# Patient Record
Sex: MALE | Race: WHITE | NOT HISPANIC OR LATINO | Employment: FULL TIME | URBAN - METROPOLITAN AREA
[De-identification: names, ages, dates, MRNs, and addresses within clinical notes are randomized per-mention and may not be internally consistent; named-entity substitution may affect disease eponyms.]

---

## 2020-12-22 DIAGNOSIS — Z23 NEED FOR VACCINATION: ICD-10-CM

## 2021-02-23 ENCOUNTER — EH NON-PROVIDER (OUTPATIENT)
Dept: OCCUPATIONAL MEDICINE | Facility: CLINIC | Age: 21
End: 2021-02-23
Payer: MEDICAID

## 2021-02-23 ENCOUNTER — HOSPITAL ENCOUNTER (OUTPATIENT)
Facility: MEDICAL CENTER | Age: 21
End: 2021-02-23
Attending: NURSE PRACTITIONER
Payer: COMMERCIAL

## 2021-02-23 ENCOUNTER — EMPLOYEE HEALTH (OUTPATIENT)
Dept: OCCUPATIONAL MEDICINE | Facility: CLINIC | Age: 21
End: 2021-02-23
Payer: MEDICAID

## 2021-02-23 DIAGNOSIS — Z02.1 PRE-EMPLOYMENT DRUG SCREENING: Primary | ICD-10-CM

## 2021-02-23 DIAGNOSIS — Z02.1 PRE-EMPLOYMENT HEALTH SCREENING EXAMINATION: ICD-10-CM

## 2021-02-23 DIAGNOSIS — Z02.1 PRE-EMPLOYMENT DRUG SCREENING: ICD-10-CM

## 2021-02-23 LAB
AMP AMPHETAMINE: NORMAL
BAR BARBITURATES: NORMAL
BZO BENZODIAZEPINES: NORMAL
COC COCAINE: NORMAL
INT CON NEG: NORMAL
INT CON POS: NORMAL
MDMA ECSTASY: NORMAL
MET METHAMPHETAMINES: NORMAL
MTD METHADONE: NORMAL
OPI OPIATES: NORMAL
OXY OXYCODONE: NORMAL
PCP PHENCYCLIDINE: NORMAL
POC URINE DRUG SCREEN OCDRS: NEGATIVE
THC: NORMAL

## 2021-02-23 PROCEDURE — 8915 PR COMPREHENSIVE PHYSICAL: Performed by: PREVENTIVE MEDICINE

## 2021-02-23 PROCEDURE — 80305 DRUG TEST PRSMV DIR OPT OBS: CPT | Performed by: NURSE PRACTITIONER

## 2021-02-23 PROCEDURE — 86480 TB TEST CELL IMMUN MEASURE: CPT | Performed by: NURSE PRACTITIONER

## 2021-02-26 LAB
GAMMA INTERFERON BACKGROUND BLD IA-ACNC: 0.04 IU/ML
M TB IFN-G BLD-IMP: NEGATIVE
M TB IFN-G CD4+ BCKGRND COR BLD-ACNC: 0.13 IU/ML
MITOGEN IGNF BCKGRD COR BLD-ACNC: >10 IU/ML
QFT TB2 - NIL TBQ2: 0.13 IU/ML

## 2021-03-03 ENCOUNTER — EH NON-PROVIDER (OUTPATIENT)
Dept: OCCUPATIONAL MEDICINE | Facility: CLINIC | Age: 21
End: 2021-03-03
Payer: MEDICAID

## 2021-03-03 DIAGNOSIS — Z71.85 IMMUNIZATION COUNSELING: ICD-10-CM

## 2021-05-13 ENCOUNTER — IMMUNIZATION (OUTPATIENT)
Dept: OTHER | Facility: MEDICAL CENTER | Age: 21
End: 2021-05-13
Attending: NURSE PRACTITIONER
Payer: COMMERCIAL

## 2021-05-13 DIAGNOSIS — Z02.1 PRE-EMPLOYMENT DRUG SCREENING: ICD-10-CM

## 2021-05-13 DIAGNOSIS — Z23 ENCOUNTER FOR VACCINATION: Primary | ICD-10-CM

## 2021-05-13 PROCEDURE — 0011A MODERNA SARS-COV-2 VACCINE: CPT

## 2021-05-13 PROCEDURE — 91301 MODERNA SARS-COV-2 VACCINE: CPT

## 2021-07-09 ENCOUNTER — EH NON-PROVIDER (OUTPATIENT)
Dept: OCCUPATIONAL MEDICINE | Facility: CLINIC | Age: 21
End: 2021-07-09

## 2021-07-09 DIAGNOSIS — Z02.89 ENCOUNTER FOR OCCUPATIONAL HEALTH EXAMINATION INVOLVING RESPIRATOR: ICD-10-CM

## 2021-07-09 PROCEDURE — 94375 RESPIRATORY FLOW VOLUME LOOP: CPT | Performed by: PREVENTIVE MEDICINE

## 2021-09-17 ENCOUNTER — TELEPHONE (OUTPATIENT)
Dept: SCHEDULING | Facility: IMAGING CENTER | Age: 21
End: 2021-09-17

## 2021-09-20 ENCOUNTER — HOSPITAL ENCOUNTER (OUTPATIENT)
Dept: LAB | Facility: MEDICAL CENTER | Age: 21
End: 2021-09-20
Attending: FAMILY MEDICINE
Payer: COMMERCIAL

## 2021-09-20 ENCOUNTER — OFFICE VISIT (OUTPATIENT)
Dept: MEDICAL GROUP | Facility: PHYSICIAN GROUP | Age: 21
End: 2021-09-20
Payer: COMMERCIAL

## 2021-09-20 VITALS
WEIGHT: 217.4 LBS | BODY MASS INDEX: 34.12 KG/M2 | SYSTOLIC BLOOD PRESSURE: 130 MMHG | DIASTOLIC BLOOD PRESSURE: 88 MMHG | OXYGEN SATURATION: 97 % | TEMPERATURE: 98.1 F | HEIGHT: 67 IN | HEART RATE: 68 BPM

## 2021-09-20 DIAGNOSIS — E66.9 OBESITY (BMI 30-39.9): ICD-10-CM

## 2021-09-20 DIAGNOSIS — M54.50 CHRONIC BILATERAL LOW BACK PAIN WITHOUT SCIATICA: ICD-10-CM

## 2021-09-20 DIAGNOSIS — Z23 NEED FOR VACCINATION: ICD-10-CM

## 2021-09-20 DIAGNOSIS — G89.29 CHRONIC BILATERAL LOW BACK PAIN WITHOUT SCIATICA: ICD-10-CM

## 2021-09-20 LAB
ALBUMIN SERPL BCP-MCNC: 4.2 G/DL (ref 3.2–4.9)
ALBUMIN/GLOB SERPL: 1.4 G/DL
ALP SERPL-CCNC: 84 U/L (ref 30–99)
ALT SERPL-CCNC: 28 U/L (ref 2–50)
ANION GAP SERPL CALC-SCNC: 15 MMOL/L (ref 7–16)
AST SERPL-CCNC: 62 U/L (ref 12–45)
BILIRUB SERPL-MCNC: 0.7 MG/DL (ref 0.1–1.5)
BUN SERPL-MCNC: 11 MG/DL (ref 8–22)
CALCIUM SERPL-MCNC: 9.4 MG/DL (ref 8.5–10.5)
CHLORIDE SERPL-SCNC: 101 MMOL/L (ref 96–112)
CHOLEST SERPL-MCNC: 139 MG/DL (ref 100–199)
CO2 SERPL-SCNC: 23 MMOL/L (ref 20–33)
CREAT SERPL-MCNC: 0.85 MG/DL (ref 0.5–1.4)
ERYTHROCYTE [DISTWIDTH] IN BLOOD BY AUTOMATED COUNT: 40.6 FL (ref 35.9–50)
EST. AVERAGE GLUCOSE BLD GHB EST-MCNC: 114 MG/DL
GLOBULIN SER CALC-MCNC: 3 G/DL (ref 1.9–3.5)
GLUCOSE SERPL-MCNC: 71 MG/DL (ref 65–99)
HBA1C MFR BLD: 5.6 % (ref 4–5.6)
HCT VFR BLD AUTO: 46.2 % (ref 42–52)
HDLC SERPL-MCNC: 51 MG/DL
HGB BLD-MCNC: 15.9 G/DL (ref 14–18)
LDLC SERPL CALC-MCNC: 76 MG/DL
MCH RBC QN AUTO: 30.3 PG (ref 27–33)
MCHC RBC AUTO-ENTMCNC: 34.4 G/DL (ref 33.7–35.3)
MCV RBC AUTO: 88 FL (ref 81.4–97.8)
PLATELET # BLD AUTO: 202 K/UL (ref 164–446)
PMV BLD AUTO: 11 FL (ref 9–12.9)
POTASSIUM SERPL-SCNC: 3.8 MMOL/L (ref 3.6–5.5)
PROT SERPL-MCNC: 7.2 G/DL (ref 6–8.2)
RBC # BLD AUTO: 5.25 M/UL (ref 4.7–6.1)
SODIUM SERPL-SCNC: 139 MMOL/L (ref 135–145)
TRIGL SERPL-MCNC: 58 MG/DL (ref 0–149)
TSH SERPL DL<=0.005 MIU/L-ACNC: 0.39 UIU/ML (ref 0.38–5.33)
WBC # BLD AUTO: 8.5 K/UL (ref 4.8–10.8)

## 2021-09-20 PROCEDURE — 80061 LIPID PANEL: CPT

## 2021-09-20 PROCEDURE — 36415 COLL VENOUS BLD VENIPUNCTURE: CPT

## 2021-09-20 PROCEDURE — 80053 COMPREHEN METABOLIC PANEL: CPT

## 2021-09-20 PROCEDURE — 85027 COMPLETE CBC AUTOMATED: CPT

## 2021-09-20 PROCEDURE — 99204 OFFICE O/P NEW MOD 45 MIN: CPT | Mod: 25 | Performed by: FAMILY MEDICINE

## 2021-09-20 PROCEDURE — 84443 ASSAY THYROID STIM HORMONE: CPT

## 2021-09-20 PROCEDURE — 90471 IMMUNIZATION ADMIN: CPT | Performed by: FAMILY MEDICINE

## 2021-09-20 PROCEDURE — 90686 IIV4 VACC NO PRSV 0.5 ML IM: CPT | Performed by: FAMILY MEDICINE

## 2021-09-20 PROCEDURE — 83036 HEMOGLOBIN GLYCOSYLATED A1C: CPT

## 2021-09-20 RX ORDER — CYCLOBENZAPRINE HCL 5 MG
5 TABLET ORAL 3 TIMES DAILY PRN
Qty: 30 TABLET | Refills: 0 | Status: SHIPPED | OUTPATIENT
Start: 2021-09-20 | End: 2021-12-02

## 2021-09-20 ASSESSMENT — FIBROSIS 4 INDEX: FIB4 SCORE: 0.38

## 2021-09-20 NOTE — PROGRESS NOTES
"Subjective:     CC:  Diagnoses of Obesity (BMI 30-39.9), Chronic bilateral low back pain without sciatica, and Need for vaccination were pertinent to this visit.    HISTORY OF THE PRESENT ILLNESS: Patient is a 20 y.o. male. This pleasant patient is here today to establish care and discuss the following problems.     Obesity (BMI 30-39.9)  Chronic issue  Has tried to lose weight. He is trying to improve his diet. Still likes sweets  Also trying to keep active  Lost from 240 to 217lbs.   Goal is is 175lbs by 2/22    Chronic bilateral low back pain without sciatica  Chronic issue   Has had low back pain for a couple of years  Pain is mainly in the morning  Has not taken any medicine for it  Denies cauda equina  Would like x rays      Allergies: Patient has no known allergies.    Current Outpatient Medications Ordered in Epic   Medication Sig Dispense Refill   • cyclobenzaprine (FLEXERIL) 5 mg tablet Take 1 Tablet by mouth 3 times a day as needed. 30 Tablet 0     No current Epic-ordered facility-administered medications on file.       Past Medical History:   Diagnosis Date   • Anxiety    • Depression    • Eating disorder          Health Maintenance: Completed    ROS:   Gen: no fevers/chills, no changes in weight  Eyes: no changes in vision  ENT: no sore throat, no hearing loss, no bloody nose  Pulm: no sob, no cough  CV: no chest pain, no palpitations  GI: no nausea/vomiting, no diarrhea  Skin: no rash      Objective:     Exam: /88 (BP Location: Right arm, Patient Position: Sitting, BP Cuff Size: Adult)   Pulse 68   Temp 36.7 °C (98.1 °F) (Temporal)   Ht 1.702 m (5' 7\")   Wt 98.6 kg (217 lb 6.4 oz)   SpO2 97%  Body mass index is 34.05 kg/m².    Constitutional: Alert, no distress, well-groomed.  Skin: Warm, dry, good turgor, no rashes in visible areas.  Eye: Equal, round and reactive, conjunctiva clear, lids normal.  ENMT: Lips without lesions, good dentition, moist mucous membranes.  Neck: Trachea midline, no " masses, no thyromegaly.  Respiratory: Unlabored respiratory effort, no cough.  MSK: Normal gait, moves all extremities.  Neuro: Grossly non-focal.   Psych: Alert and oriented x3, normal affect and mood.        Assessment & Plan:   20 y.o. male with the following -    1. Obesity (BMI 30-39.9)  Chronic, stable condition.  The patient has been reinforcing lifestyle change and has been able to successfully lose about 23 pounds in the recent past.  I commended him for his efforts.  I would like to establish baseline blood work today to establish a baseline.  - CBC WITHOUT DIFFERENTIAL; Future  - Comp Metabolic Panel; Future  - HEMOGLOBIN A1C; Future  - Lipid Profile; Future  - TSH WITH REFLEX TO FT4; Future    2. Chronic bilateral low back pain without sciatica  Chronic, stable condition.  Longstanding history of low back pain especially in the morning.  I would like to proceed with x-rays of the lumbar spine and a trial of Flexeril.  - DX-LUMBAR SPINE-2 OR 3 VIEWS; Future  - cyclobenzaprine (FLEXERIL) 5 mg tablet; Take 1 Tablet by mouth 3 times a day as needed.  Dispense: 30 Tablet; Refill: 0    3. Need for vaccination  - Influenza Vaccine Quad Injection (PF)      Return in about 3 months (around 12/20/2021).    Please note that this dictation was created using voice recognition software. I have made every reasonable attempt to correct obvious errors, but I expect that there are errors of grammar and possibly content that I did not discover before finalizing the note.

## 2021-09-20 NOTE — ASSESSMENT & PLAN NOTE
Chronic issue  Has tried to lose weight. He is trying to improve his diet. Still likes sweets  Also trying to keep active  Lost from 240 to 217lbs.   Goal is is 175lbs by 2/22

## 2021-09-20 NOTE — ASSESSMENT & PLAN NOTE
Chronic issue   Has had low back pain for a couple of years  Pain is mainly in the morning  Has not taken any medicine for it  Denies cauda equina  Would like x rays

## 2021-12-01 NOTE — PROGRESS NOTES
Subjective:     CC:  Diagnoses of Obesity (BMI 30-39.9), Episode of recurrent major depressive disorder, unspecified depression episode severity (HCC), Elevated AST (SGOT), Social anxiety disorder, LENIN (obstructive sleep apnea), Screening for HIV (human immunodeficiency virus), and Need for hepatitis C screening test were pertinent to this visit.    HISTORY OF THE PRESENT ILLNESS: Patient is a 20 y.o. male. This pleasant patient is here today to establish care and discuss chronic conditions. His prior PCP was Dr. Elio Hagen.    Problem   Depression    Chronic condition since high school. He feels sad sometimes, low energy, low energy, brain fog. He has tried counseling therapy last year which helped but there was an issue with insurance so that stopped. He has never tried medication in the past. There are home stressors and work stressors. Denies SI/HI. He sleeps about 5-6 hours a night. He does feel sleepy in the afternoon. He tends to eat a lot at once for dinner. Energy level is usually low. Hanging out with his friend and family makes him happy.     Elevated Ast (Sgot)    Chronic condition. Recent AST 60s (09/2021).      Social Anxiety Disorder    Chronic condition. He feels he tends to keep to himself most of the time.     Lenin (Obstructive Sleep Apnea)    Chronic condition. Patient states he sleeps about 5-6 hours each night. He does snore at night. He does feel sleepy in the afternoon everyday. His father has LENIN.     Obesity (Bmi 30-39.9)    Chronic condition. He tends to eat fast food in general. He stays active with going to gym 3x/week for about an hour each time.       Chronic Bilateral Low Back Pain Without Sciatica (Resolved)       Current Outpatient Medications Ordered in Epic   Medication Sig Dispense Refill   • FLUoxetine (PROZAC) 10 MG Cap Take 1 Capsule by mouth every day. 30 Capsule 3     No current [facility]-ordered facility-administered medications on file.     SH: lives with parents at home,  "single, he drinks 10 shots at parties which is occasional, he is going to [employer] for associate degree, never smoker, social EtOH, denies illicit drugs    Health Maintenance: reviewed and discussed with patient  Vaccines: flu received 09/2021, Moderna #2 received 07/2021, Tdap received 02/2020, Trumenba completed 07/2018, Gardasil 9 completed 02/2016    ROS:   Gen: no fevers/chills, no changes in weight  Eyes: no changes in vision  ENT: no sore throat, no hearing loss, no bloody nose  Pulm: no sob, no cough  CV: no chest pain, no palpitations  GI: no nausea/vomiting, no diarrhea  : no dysuria  MSk: no myalgias  Skin: no rash  Neuro: no headaches, no numbness/tingling  Heme/Lymph: no easy bruising      Objective:     Exam: /84 (BP Location: Left arm, Patient Position: Sitting, BP Cuff Size: Adult)   Pulse 72   Temp 36.7 °C (98 °F) (Temporal)   Resp 16   Ht 1.727 m (5' 8\")   Wt 100 kg (220 lb 7.4 oz)   SpO2 97%  Body mass index is 33.52 kg/m².    General: Normal appearing. No distress.  HEENT: Normocephalic. Nasal mucosa benign, oropharynx is without erythema, edema or exudates. Mallampati 4.   Neck: Supple without JVD or bruit. Neck circumference 44.5cm.  Pulmonary: Clear to ausculation. Normal effort. No rales, ronchi, or wheezing.  Cardiovascular: Regular rate and rhythm without murmur.  Abdomen: Soft, nontender, nondistended. Normal bowel sounds.  Neurologic: Grossly nonfocal  Skin: Warm and dry.  No obvious lesions.  Musculoskeletal: Normal gait. No extremity cyanosis, clubbing, or edema.  Psych: Normal mood and affect. Alert and oriented x3. Judgment and insight is normal.    Labs:   Lab Results   Component Value Date/Time    WBC 8.5 09/20/2021 03:19 PM    RBC 5.25 09/20/2021 03:19 PM    HEMOGLOBIN 15.9 09/20/2021 03:19 PM    HEMATOCRIT 46.2 09/20/2021 03:19 PM    MCV 88.0 09/20/2021 03:19 PM    MCH 30.3 09/20/2021 03:19 PM    MCHC 34.4 09/20/2021 03:19 PM    MPV 11.0 09/20/2021 03:19 PM      Lab " Results   Component Value Date/Time    SODIUM 139 09/20/2021 03:19 PM    POTASSIUM 3.8 09/20/2021 03:19 PM    CHLORIDE 101 09/20/2021 03:19 PM    CO2 23 09/20/2021 03:19 PM    ANION 15.0 09/20/2021 03:19 PM    GLUCOSE 71 09/20/2021 03:19 PM    BUN 11 09/20/2021 03:19 PM    CREATININE 0.85 09/20/2021 03:19 PM    CALCIUM 9.4 09/20/2021 03:19 PM    ASTSGOT 62 (H) 09/20/2021 03:19 PM    ALTSGPT 28 09/20/2021 03:19 PM    TBILIRUBIN 0.7 09/20/2021 03:19 PM    ALBUMIN 4.2 09/20/2021 03:19 PM    TOTPROTEIN 7.2 09/20/2021 03:19 PM    GLOBULIN 3.0 09/20/2021 03:19 PM    AGRATIO 1.4 09/20/2021 03:19 PM     Lab Results   Component Value Date/Time    HBA1C 5.6 09/20/2021 03:19 PM      Lab Results   Component Value Date/Time    CHOLSTRLTOT 139 09/20/2021 1519    TRIGLYCERIDE 58 09/20/2021 1519    HDL 51 09/20/2021 1519    LDL 76 09/20/2021 1519     Lab Results   Component Value Date/Time    TSHULTRASEN 0.390 09/20/2021 1519     PHQ9 score 10    STOP-BANG score 4 high risk for HEMANT    Assessment & Plan:   20 y.o. male with the following -    Problem List Items Addressed This Visit     Obesity (BMI 30-39.9)  Body mass index is 33.52 kg/m².  - discussed lifestyle modifications including weight loss (lose 5-10% of current body weight, no more than 2 pounds per week), healthy diet (eat regular meals with a variety of food, limit daily intake of saturated fat and sodium, limit high-sugar foods, avoid sodas and alcohol, and stay physically active (at least 30 minutes of moderate-intensity physical activity 3-5x/week)       Depression  Chronic condition, persistent. PHQ9 score 10. Patient would like to trial medication in addition to psychotherapy.  - start Prozac 10mg daily, adjust dose based on tolerability/response  - refer to behavioral health  - we discussed adverse effects of SSRI including fatigue/more energy or anxiety, rare incidence of suicidality, weight changes, headaches when starting. Patient advised to return to clinic if  having these or other symptoms associated with the new medication    Relevant Medications    FLUoxetine (PROZAC) 10 MG Cap    Other Relevant Orders    Referral to Behavioral Health    Elevated AST (SGOT)  Possibly from occasional alcohol use.  - f/u liver panel, HIV and hepC screen    Relevant Orders    HEPATIC FUNCTION PANEL    Social anxiety disorder  Chronic condition, persistent.  - start Prozac 10mg daily, adjust dose based on tolerability/response  - we discussed adverse effects of SSRI including fatigue/more energy or anxiety, rare incidence of suicidality, weight changes, headaches when starting. Patient advised to return to clinic if having these or other symptoms associated with the new medication    Relevant Medications    FLUoxetine (PROZAC) 10 MG Cap    HEMANT (obstructive sleep apnea), suspected  - refer for sleep study to rule out HEMANT    Relevant Orders    Referral to Pulmonary and Sleep Medicine      Other Visit Diagnoses     Screening for HIV (human immunodeficiency virus)        Relevant Orders    HIV AG/AB COMBO ASSAY DIAGNOSTIC    Need for hepatitis C screening test        Relevant Orders    HEP C VIRUS ANTIBODY        Return in about 2 months (around 2/2/2022) for f/u depression.    Please note that this dictation was created using voice recognition software. I have made every reasonable attempt to correct obvious errors, but I expect that there are errors of grammar and possibly content that I did not discover before finalizing the note.

## 2021-12-02 ENCOUNTER — OFFICE VISIT (OUTPATIENT)
Dept: MEDICAL GROUP | Facility: MEDICAL CENTER | Age: 21
End: 2021-12-02
Payer: COMMERCIAL

## 2021-12-02 VITALS
BODY MASS INDEX: 33.41 KG/M2 | OXYGEN SATURATION: 97 % | SYSTOLIC BLOOD PRESSURE: 112 MMHG | HEIGHT: 68 IN | TEMPERATURE: 98 F | WEIGHT: 220.46 LBS | HEART RATE: 72 BPM | RESPIRATION RATE: 16 BRPM | DIASTOLIC BLOOD PRESSURE: 84 MMHG

## 2021-12-02 DIAGNOSIS — Z11.59 NEED FOR HEPATITIS C SCREENING TEST: ICD-10-CM

## 2021-12-02 DIAGNOSIS — F33.9 EPISODE OF RECURRENT MAJOR DEPRESSIVE DISORDER, UNSPECIFIED DEPRESSION EPISODE SEVERITY (HCC): ICD-10-CM

## 2021-12-02 DIAGNOSIS — G47.33 OSA (OBSTRUCTIVE SLEEP APNEA): ICD-10-CM

## 2021-12-02 DIAGNOSIS — R74.01 ELEVATED AST (SGOT): ICD-10-CM

## 2021-12-02 DIAGNOSIS — F40.10 SOCIAL ANXIETY DISORDER: ICD-10-CM

## 2021-12-02 DIAGNOSIS — E66.9 OBESITY (BMI 30-39.9): ICD-10-CM

## 2021-12-02 DIAGNOSIS — Z11.4 SCREENING FOR HIV (HUMAN IMMUNODEFICIENCY VIRUS): ICD-10-CM

## 2021-12-02 PROBLEM — G89.29 CHRONIC BILATERAL LOW BACK PAIN WITHOUT SCIATICA: Status: RESOLVED | Noted: 2021-09-20 | Resolved: 2021-12-02

## 2021-12-02 PROBLEM — M54.50 CHRONIC BILATERAL LOW BACK PAIN WITHOUT SCIATICA: Status: RESOLVED | Noted: 2021-09-20 | Resolved: 2021-12-02

## 2021-12-02 PROBLEM — F32.A DEPRESSION: Status: ACTIVE | Noted: 2021-12-02

## 2021-12-02 PROCEDURE — 99214 OFFICE O/P EST MOD 30 MIN: CPT | Performed by: STUDENT IN AN ORGANIZED HEALTH CARE EDUCATION/TRAINING PROGRAM

## 2021-12-02 RX ORDER — FLUOXETINE 10 MG/1
10 CAPSULE ORAL DAILY
Qty: 30 CAPSULE | Refills: 3 | Status: SHIPPED | OUTPATIENT
Start: 2021-12-02 | End: 2021-12-23

## 2021-12-02 ASSESSMENT — FIBROSIS 4 INDEX: FIB4 SCORE: 1.22

## 2021-12-02 ASSESSMENT — PATIENT HEALTH QUESTIONNAIRE - PHQ9
CLINICAL INTERPRETATION OF PHQ2 SCORE: 2
5. POOR APPETITE OR OVEREATING: 1 - SEVERAL DAYS
SUM OF ALL RESPONSES TO PHQ QUESTIONS 1-9: 9

## 2021-12-04 ENCOUNTER — OFFICE VISIT (OUTPATIENT)
Dept: URGENT CARE | Facility: CLINIC | Age: 21
End: 2021-12-04
Payer: COMMERCIAL

## 2021-12-04 ENCOUNTER — HOSPITAL ENCOUNTER (OUTPATIENT)
Facility: MEDICAL CENTER | Age: 21
End: 2021-12-04
Attending: PHYSICIAN ASSISTANT
Payer: COMMERCIAL

## 2021-12-04 VITALS
OXYGEN SATURATION: 98 % | TEMPERATURE: 99.2 F | SYSTOLIC BLOOD PRESSURE: 130 MMHG | RESPIRATION RATE: 16 BRPM | HEART RATE: 98 BPM | BODY MASS INDEX: 33.34 KG/M2 | HEIGHT: 68 IN | WEIGHT: 220 LBS | DIASTOLIC BLOOD PRESSURE: 86 MMHG

## 2021-12-04 DIAGNOSIS — R68.89 FLU-LIKE SYMPTOMS: ICD-10-CM

## 2021-12-04 DIAGNOSIS — U07.1 COVID-19: ICD-10-CM

## 2021-12-04 LAB
EXTERNAL QUALITY CONTROL: NORMAL
FLUAV+FLUBV AG SPEC QL IA: NEGATIVE
INT CON NEG: NORMAL
INT CON NEG: NORMAL
INT CON POS: NORMAL
INT CON POS: NORMAL
S PYO AG THROAT QL: NEGATIVE
SARS-COV+SARS-COV-2 AG RESP QL IA.RAPID: POSITIVE

## 2021-12-04 PROCEDURE — 87804 INFLUENZA ASSAY W/OPTIC: CPT | Performed by: PHYSICIAN ASSISTANT

## 2021-12-04 PROCEDURE — 99213 OFFICE O/P EST LOW 20 MIN: CPT | Mod: CS | Performed by: PHYSICIAN ASSISTANT

## 2021-12-04 PROCEDURE — 87880 STREP A ASSAY W/OPTIC: CPT | Performed by: PHYSICIAN ASSISTANT

## 2021-12-04 PROCEDURE — 87426 SARSCOV CORONAVIRUS AG IA: CPT | Performed by: PHYSICIAN ASSISTANT

## 2021-12-04 PROCEDURE — U0003 INFECTIOUS AGENT DETECTION BY NUCLEIC ACID (DNA OR RNA); SEVERE ACUTE RESPIRATORY SYNDROME CORONAVIRUS 2 (SARS-COV-2) (CORONAVIRUS DISEASE [COVID-19]), AMPLIFIED PROBE TECHNIQUE, MAKING USE OF HIGH THROUGHPUT TECHNOLOGIES AS DESCRIBED BY CMS-2020-01-R: HCPCS

## 2021-12-04 PROCEDURE — U0005 INFEC AGEN DETEC AMPLI PROBE: HCPCS

## 2021-12-04 ASSESSMENT — ENCOUNTER SYMPTOMS
SORE THROAT: 1
MYALGIAS: 1
COUGH: 1
FEVER: 1

## 2021-12-04 ASSESSMENT — FIBROSIS 4 INDEX: FIB4 SCORE: 1.22

## 2021-12-04 NOTE — PROGRESS NOTES
"  Subjective:   Syd Ferris is a 20 y.o. male who presents today with   Chief Complaint   Patient presents with   • Coronavirus Screening     loss of balance, sob, fever,sore throat, cough,nasal congestion x last night; possible exposure( been working [employer])        Fever   This is a new problem. The current episode started yesterday. The problem occurs constantly. The problem has been unchanged. Associated symptoms include congestion, coughing, muscle aches and a sore throat. Pertinent negatives include no chest pain. He has tried nothing for the symptoms. The treatment provided no relief.   I personally donned proper PPE throughout visit today.     PMH:  has a past medical history of Anxiety, Depression, and Eating disorder.  MEDS:   Current Outpatient Medications:   •  FLUoxetine (PROZAC) 10 MG Cap, Take 1 Capsule by mouth every day., Disp: 30 Capsule, Rfl: 3  ALLERGIES: No Known Allergies  SURGHX: No past surgical history on file.  SOCHX:  reports that he has never smoked. He has never used smokeless tobacco. He reports current alcohol use. He reports that he does not use drugs.  FH: Reviewed with patient, not pertinent to this visit.     Review of Systems   Constitutional: Positive for fever and malaise/fatigue.   HENT: Positive for congestion and sore throat.    Respiratory: Positive for cough.    Cardiovascular: Negative for chest pain.   Musculoskeletal: Positive for myalgias.        Objective:   /86 (BP Location: Right arm, Patient Position: Sitting)   Pulse 98   Temp 37.3 °C (99.2 °F) (Temporal)   Resp 16   Ht 1.727 m (5' 8\")   Wt 99.8 kg (220 lb)   SpO2 98%   BMI 33.45 kg/m²   Physical Exam  Vitals and nursing note reviewed.   Constitutional:       General: He is not in acute distress.     Appearance: Normal appearance. He is well-developed. He is not ill-appearing or toxic-appearing.   HENT:      Head: Normocephalic and atraumatic.      Right Ear: Hearing normal.      Left Ear: Hearing normal. "   Eyes:      Conjunctiva/sclera: Conjunctivae normal.   Cardiovascular:      Rate and Rhythm: Normal rate and regular rhythm.      Heart sounds: Normal heart sounds.   Pulmonary:      Effort: Pulmonary effort is normal.      Breath sounds: Normal breath sounds. No stridor. No wheezing, rhonchi or rales.   Musculoskeletal:      Comments: Normal movement in all 4 extremities   Skin:     General: Skin is warm and dry.   Neurological:      Mental Status: He is alert.      Coordination: Coordination normal.   Psychiatric:         Mood and Affect: Mood normal.       STREP A NEG  COVID RAPID +  FLU negative  Assessment/Plan:   Assessment    1. Flu-like symptoms  - POCT Rapid Strep A  - POCT SARS-COV Antigen PATRICIA (Symptomatic Only)  - SARS-CoV-2 PCR (24 hour In-House): Collect NP swab in [facility]; Future  - POCT Influenza A/B    2. COVID-19  Symptoms and presentation most consistent with viral illness and confirmed positive for Covid on exam today with rapid testing.  We will send off PCR for infection prevention purposes.  Gave patient the employee Covid hotline number.  Vitals are reassuring on exam today.  Discussed CDC guidelines including self isolation at home.   Patient encouraged to get plenty of rest, use OTC tylenol for pain/fever, and drink plenty of fluids.    Differential diagnosis, natural history, supportive care, and indications for immediate follow-up discussed.   Patient given instructions and understanding of medications and treatment.    If not improving in 3-5 days, F/U with PCP or return to  if symptoms worsen.    Patient agreeable to plan.      Please note that this dictation was created using voice recognition software. I have made every reasonable attempt to correct obvious errors, but I expect that there are errors of grammar and possibly content that I did not discover before finalizing the note.    Leonides Maier PA-C

## 2021-12-05 DIAGNOSIS — R68.89 FLU-LIKE SYMPTOMS: ICD-10-CM

## 2021-12-05 LAB
COVID ORDER STATUS COVID19: NORMAL
SARS-COV-2 RNA RESP QL NAA+PROBE: DETECTED
SPECIMEN SOURCE: ABNORMAL

## 2021-12-23 DIAGNOSIS — F33.9 EPISODE OF RECURRENT MAJOR DEPRESSIVE DISORDER, UNSPECIFIED DEPRESSION EPISODE SEVERITY (HCC): ICD-10-CM

## 2021-12-23 RX ORDER — FLUOXETINE HYDROCHLORIDE 20 MG/1
20 CAPSULE ORAL DAILY
Qty: 30 CAPSULE | Refills: 3 | Status: SHIPPED | OUTPATIENT
Start: 2021-12-23 | End: 2022-01-28

## 2022-01-27 NOTE — PROGRESS NOTES
Subjective:     CC: depression follow up    HPI:   Syd presents today for depression follow up.    Problem   Depression    Chronic condition since high school. Since last visit, patient has been taking fluoxetine 20mg daily. He reports a 40% in his symptoms so far. He has noticed a favorable decreased appetite with 7lb weight loss since last visit. He is having a counseling therapy first appointment next week.    Initial history:  He feels sad sometimes, low energy, low energy, brain fog. He has tried counseling therapy last year which helped but there was an issue with insurance so that stopped. He has never tried medication in the past. There are home stressors and work stressors. Denies SI/HI. He sleeps about 5-6 hours a night. He does feel sleepy in the afternoon. He tends to eat a lot at once for dinner. Energy level is usually low. Hanging out with his friend and family makes him happy.     Social Anxiety Disorder    Chronic condition. He feels he tends to keep to himself most of the time. He has been taking fluoxetine 20mg daily since last visit and has noticed 40% improvement so far.     Hemant (Obstructive Sleep Apnea)    Chronic condition. Patient states he sleeps about 5-6 hours each night. He does snore at night. He does feel sleepy in the afternoon everyday. His father has HEMANT.    He has sleep study consult scheduled for next week.     Obesity (Bmi 30-39.9)    Chronic condition. He tends to eat fast food in general. He stays active with going to gym 3x/week for about an hour each time.    Has lost 7 pounds since starting fluoxetine which is favorable to him.           Current Outpatient Medications Ordered in Epic   Medication Sig Dispense Refill   • fluoxetine (PROZAC) 40 MG capsule Take 1 Capsule by mouth every day for 30 days. 30 Capsule 2     No current Epic-ordered facility-administered medications on file.     SH: lives with parents at home, single, he drinks 10 shots at parties which is  "occasional, he is going to [employer] for associate degree, never smoker, social EtOH, denies illicit drugs     Health Maintenance: reviewed and discussed with patient  Vaccines: flu received 09/2021, Moderna #2 received 07/2021, Tdap received 02/2020, Trumenba completed 07/2018, Gardasil 9 completed 02/2016    ROS:  Gen: no fevers/chills, + weight loss  Pulm: no sob, no cough  CV: no chest pain, no palpitations  GI: no nausea/vomiting, no diarrhea  Neuro: no headaches  Psych: + chronic depression, anxiety    Objective:     Exam:  /76 (BP Location: Left arm, Patient Position: Sitting, BP Cuff Size: Adult)   Pulse 82   Temp 36.9 °C (98.5 °F) (Temporal)   Resp 14   Ht 1.727 m (5' 8\")   Wt 96.9 kg (213 lb 10 oz)   SpO2 96%   BMI 32.48 kg/m²  Body mass index is 32.48 kg/m².    General: Normal appearing. No distress.  Pulmonary: Clear to ausculation. Normal effort. No rales, ronchi, or wheezing.  Cardiovascular: Regular rate and rhythm without murmur.  Abdomen: Soft, nontender, nondistended. Normal bowel sounds.  Psych: Normal mood and affect. Alert and oriented x3. Judgment and insight is normal.    Labs: no new lab results since last visit    Assessment & Plan:     21 y.o. male with the following -     1. Episode of recurrent major depressive disorder, unspecified depression episode severity (HCC)  Chronic condition, improved.  - increase fluoxetine 20mg to 40mg daily  - cont scheduled psychotherapy next week  - fluoxetine (PROZAC) 40 MG capsule; Take 1 Capsule by mouth every day for 30 days.  Dispense: 30 Capsule; Refill: 2    2. Social anxiety disorder  - plan as above  - fluoxetine (PROZAC) 40 MG capsule; Take 1 Capsule by mouth every day for 30 days.  Dispense: 30 Capsule; Refill: 2    3. HEMANT (obstructive sleep apnea)  - cont with scheduled sleep medicine consult next week    4. Obesity (BMI 30-39.9)  Chronic condition, improved.  - cont healthy diet, exercise, weight loss  - plan to repeat and assess thyroid " function given borderline low TSH  - TSH; Future  - FREE THYROXINE; Future      Return in about 2 months (around 3/28/2022) for chronic medical conditions.    Please note that this dictation was created using voice recognition software. I have made every reasonable attempt to correct obvious errors, but I expect that there are errors of grammar and possibly content that I did not discover before finalizing the note.

## 2022-01-28 ENCOUNTER — OFFICE VISIT (OUTPATIENT)
Dept: MEDICAL GROUP | Facility: MEDICAL CENTER | Age: 22
End: 2022-01-28
Payer: COMMERCIAL

## 2022-01-28 VITALS
OXYGEN SATURATION: 96 % | SYSTOLIC BLOOD PRESSURE: 128 MMHG | RESPIRATION RATE: 14 BRPM | WEIGHT: 213.63 LBS | DIASTOLIC BLOOD PRESSURE: 76 MMHG | BODY MASS INDEX: 32.38 KG/M2 | HEART RATE: 82 BPM | HEIGHT: 68 IN | TEMPERATURE: 98.5 F

## 2022-01-28 DIAGNOSIS — E66.9 OBESITY (BMI 30-39.9): ICD-10-CM

## 2022-01-28 DIAGNOSIS — G47.33 OSA (OBSTRUCTIVE SLEEP APNEA): ICD-10-CM

## 2022-01-28 DIAGNOSIS — F40.10 SOCIAL ANXIETY DISORDER: ICD-10-CM

## 2022-01-28 DIAGNOSIS — F33.9 EPISODE OF RECURRENT MAJOR DEPRESSIVE DISORDER, UNSPECIFIED DEPRESSION EPISODE SEVERITY (HCC): ICD-10-CM

## 2022-01-28 PROCEDURE — 99214 OFFICE O/P EST MOD 30 MIN: CPT | Performed by: STUDENT IN AN ORGANIZED HEALTH CARE EDUCATION/TRAINING PROGRAM

## 2022-01-28 RX ORDER — FLUOXETINE HYDROCHLORIDE 40 MG/1
40 CAPSULE ORAL DAILY
Qty: 30 CAPSULE | Refills: 2 | Status: SHIPPED | OUTPATIENT
Start: 2022-01-28 | End: 2022-02-27

## 2022-01-28 ASSESSMENT — FIBROSIS 4 INDEX: FIB4 SCORE: 1.28

## 2022-01-31 ENCOUNTER — APPOINTMENT (OUTPATIENT)
Dept: SLEEP MEDICINE | Facility: MEDICAL CENTER | Age: 22
End: 2022-01-31
Payer: COMMERCIAL

## 2022-02-02 ENCOUNTER — OFFICE VISIT (OUTPATIENT)
Dept: BEHAVIORAL HEALTH | Facility: CLINIC | Age: 22
End: 2022-02-02
Payer: COMMERCIAL

## 2022-02-02 DIAGNOSIS — F33.1 MODERATE EPISODE OF RECURRENT MAJOR DEPRESSIVE DISORDER (HCC): ICD-10-CM

## 2022-02-02 DIAGNOSIS — F41.1 GENERALIZED ANXIETY DISORDER: ICD-10-CM

## 2022-02-02 PROCEDURE — 99204 OFFICE O/P NEW MOD 45 MIN: CPT | Mod: GC | Performed by: PSYCHIATRY & NEUROLOGY

## 2022-02-02 ASSESSMENT — PATIENT HEALTH QUESTIONNAIRE - PHQ9
5. POOR APPETITE OR OVEREATING: 2 - MORE THAN HALF THE DAYS
SUM OF ALL RESPONSES TO PHQ QUESTIONS 1-9: 14
CLINICAL INTERPRETATION OF PHQ2 SCORE: 4

## 2022-02-02 NOTE — PROGRESS NOTES
"  INITIAL PSYCHIATRIC EVALUATION    This provider informed the patient their medical records are totally confidential except for the use by other providers involved in their care, or if the patient signs a release, or to report instances of child or elder abuse, or if it is determined they are an immediate risk to harm themselves or others.    CHIEF COMPLAINT  \"Anxiety and depression.\"      HISTORY OF PRESENT ILLNESS  Syd Ferris is a 21 y.o. old [place] American male who presents today to establish care; he is new to the clinic.     He reports anxiety and depression starting in High School. He attended [employer] for a few months after HS then dropped out due to low motivation and not attending class. 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 Worst depressive phase was after he dropped out and lasted 6 months. He reports depressed mood, anhedonia, feeling tired, erratic eating habits, trouble focusing, trouble planning. He often feels restless and fidgets or paces. He recently increased prozac to 40mg less than 1 week ago.     He reports anxiety that started around that time. He worries a lot about his future and feels very uncertain. He is irritable frequently. He reports good sleep and no insomnia. He denies any history of liliya or hypomania.     He wants to eventually go back to undergraduate studies and last semester started again at [employer]. He passed 1 of 2 classes. He is enrolled in 2 classes, both virtual. He likes psychology. His mental health goals are to find his motivation and clarity for his life. He has started going back to the gym again last week.    He describes his upbringing as very traditional, with his father shutting down any discussion of mental health. His parents are from [place] and traditional islamic. When he was in elementary school, his sister (10 years older) began showing signs of " mental illness and was later diagnosed schizophrenia and bipolar. She would frequently keep the patient housebound or grounded for perceived infractions. About 8 months ago, he called the police on his sister due to verbal aggression.       PSYCHIATRIC REVIEW OF SYSTEMS: denies manic symptoms, denies psychotic symptoms including AH / VH, denies OCD symptoms, denies restrictive eating or purging, denies trauma related symptoms, see HPI for depressive symptoms and see HPI for anxeity symptoms      MEDICAL REVIEW OF SYSTEMS:   Constitutional positive - daytime fatigue   Eyes negative   Ears/Nose/Mouth/Throat negative   Cardiovascular negative   Respiratory positive - HEMANT   Gastrointestinal negative   Genitourinary negative   Muscular negative   Integumentary negative   Neurological negative   Endocrine positive - obesity   Hematologic/Lymphatic negative     CURRENT MEDICATIONS:  Current Outpatient Medications   Medication Sig Dispense Refill   • fluoxetine (PROZAC) 40 MG capsule Take 1 Capsule by mouth every day for 30 days. 30 Capsule 2     No current facility-administered medications for this visit.       ALLERGIES:  Patient has no known allergies.    PAST PSYCHIATRIC HISTORY  Prior psychiatric hospitalization: denies  Prior Self harm/suicide attempt: denies  Prior Diagnosis: MDD    PAST PSYCHIATRIC MEDICATIONS  • None prior to prozac, started in late November    FAMILY HISTORY  Psychiatric diagnosis: older sister with schizophrenia, bipolar disorder. Mother with MDD.  History of suicide attempts:  Denies   Substance abuse history:  Denies    SUBSTANCE USE HISTORY:  ALCOHOL: occas social use  TOBACCO: occas social use  CANNABIS: denies  OPIOIDS: denies  PRESCRIPTION MEDICATIONS: denies  OTHERS: denies  History of inpatient/outpatient rehab treatment: denies    SOCIAL HISTORY  Childhood: born in [place] and describes childhood as traditional  Education: MA. Dropped out of college after a few months due to lack of  motivation.  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      PHYSICAL EXAMINAION:  Vital signs: There were no vitals taken for this visit.  Musculoskeletal: Normal gait.   Abnormal movements: none    MENTAL STATUS EXAMINATION      General:   - Grooming and hygiene: Good and Casual,   - Apparent distress: none,   - Behavior: Calm  - Eye Contact:  Good,   - no psychomotor agitation or retardation    - Participation: Active verbal participation, Attentive and Engaged  Orientation: Alert and Fully Oriented to person, place and time  Mood: Euthymic  Affect: Flexible, Full range and Congruent with content,  Thought Process: Logical and Goal-directed  Thought Content: Denies suicidal or homicidal ideations, intent or plan Within normal limits  Perception: Denies auditory or visual hallucinations. No delusions noted Within normal limits  Attention span and concentration: Intact   Speech:Rate within normal limits and Volume within normal limits  Language: Appropriate   Insight: Good  Judgment: Good  Recent and remote memory: No gross evidence of memory deficits    DEPRESSION SCREENING:  Depression Screen (PHQ-2/PHQ-9) 12/2/2021   PHQ-2 Total Score 2   PHQ-9 Total Score 9       Interpretation of PHQ-9 Total Score   Score Severity   1-4 No Depression   5-9 Mild Depression   10-14 Moderate Depression   15-19 Moderately Severe Depression   20-27 Severe Depression    SAFETY ASSESSMENT - SELF:  Does patient acknowledge current or past symptoms of dangerousness to self? no  History of suicide  by family member: no  History of suicide by friend/significant other: no  Recent change in amount/specificity/intensity of suicidal thoughts or self-harm behavior? no  Current access to firearms, medications, or other identified means of suicide/self-harm? no  If yes, willing to restrict access to means of suicide/self-harm? n/a  Protective factors present: yes       SAFETY ASSESSMENT - OTHERS:  Does patient acknowledge current or past symptoms of aggressive behavior or risk to others? no  Recent change in amount/specificity/intensity of thoughts or threats to harm others? no  Current access to firearms/other identified means of harm? no  If yes, willing to restrict access to weapons/means of harm? n/a       CURRENT RISK:       Suicidal: Low       Homicidal: Low       Self-Harm: Low       Relapse: Not applicable       Crisis Safety Plan Reviewed Yes    MEDICAL RECORDS/LABS/DIAGNOSTIC TESTS REVIEWED:  yes    [place]  records -   Reviewed       ASSESSMENT  Syd Ferris is a 21 y.o. old male with pertinent medical history of obesity, HEMANT and psychiatric history of MDD who presents today for evaluation and care.      DIAGNOSES  1. Generalized anxiety disorder     2. Moderate episode of recurrent major depressive disorder (HCC)         PLAN:  • Continue prozac 40mg  • Refer to psychotherapy     • I reviewed clinical lab tests done in last 1 year. Patient will need following lab test done: none  • I reviewed the imaging as follows: (none)   • Medication options, alternatives (including no medications) and medication risks/benefits/side effects were discussed in detail.  • Explained importance of contraceptive measures while on psychotropic medications, educated to let provider know if ever pregnant or wanting to become pregnant. Verbalized understanding.  • The patient was advised to call, message provider on MyChart, or come in to the clinic if symptoms worsen or if any future questions/issues regarding their medications  arise; the patient verbalized understanding and agreement.    • The patient was educated to call 911, call the suicide hotline, or go to local ER if having thoughts of suicide or homicide; verbalized understanding.        Return to clinic in 1 month or sooner if symptoms worsen.  Next Appointment:  instruction provided on how to make the next appointment.    I have discussed with the patient/authorized legal representative the risks, benefits and alternatives to the proposed treatment and the patient has verbalized understanding and expressed agreement with the plan. Case discussed with the attending physician, who was present for critical components of the appointment.     Thank you for allowing me to participate in the care of this patient.    Judy Miguel M.D.  02/02/22    CC:   Yassine Rico D.O.    This note was created using voice recognition software (Dragon). The accuracy of the dictation is limited by the abilities of the software. I have reviewed the note prior to signing, however some errors in grammar and context are still possible. If you have any questions related to this note please do not hesitate to contact our office.

## 2022-02-04 ENCOUNTER — OFFICE VISIT (OUTPATIENT)
Dept: BEHAVIORAL HEALTH | Facility: CLINIC | Age: 22
End: 2022-02-04
Payer: COMMERCIAL

## 2022-02-04 DIAGNOSIS — F41.1 GENERALIZED ANXIETY DISORDER: ICD-10-CM

## 2022-02-04 PROCEDURE — 90791 PSYCH DIAGNOSTIC EVALUATION: CPT | Performed by: PSYCHOLOGIST

## 2022-02-05 NOTE — PROGRESS NOTES
Name: Syd Doan Date: 22 : 99 15 30 60? 75   [x] Initial Dx Eval [] Family [] Cancelled/Rescheduled [] Time in session 60 minutes   [] Individual [] Group [] Late Cancellation [] Virtual Session   [] Couple [] Testing [] No call/No show [] Late   [] Discharge [] Phone [x] On time: 3:00 PM [x]  (1)   Attended: Mr. Doan (He wore a mask)   Observations/ Appearance/ Affect: Mr. Doan appeared clean, well groomed, and clean and dressed in his work scrubs. He was oriented to person, place, time and purpose, was pleasant and cooperative.  His affect was anxious and mood was euthymic. No suicidal or homicidal ideation described or reported. No reports of hallucinations or confusions. He participated well in this session.   Content/ Topics: Mr. Doan reported that he was anxious about his future. He reported that he was not sure what he wanted to do in life. He reported that he would compare himself to his friends and see how they were doing better than he was. He reported that he wanted to be more independent. He reported that he was sad about his relationship with his father. He reported that his father had difficulty talking about problems. He reported that he had problems with his temper and could benefit from anger management.    Risk issues assessed: Mr. Doan reported that he was doing well at home and at work. He reported that he had friends. He reported that at work the “in basket” would sometime would full and seeing that would sometimes make him upset. Discussed with Mr. Doan his current concerns regarding the Coronavirus and his concerns about contagion. Discussed how any information about this virus may be found on the Center for Disease Control website or the [facility] Coronavirus Resource Center ([email or web address].[facility].edu/) for accurate information about it. Discussed how recommended precautions seemed prudent: wash hand frequently, avoid crowds, wear masks when out and about,  maintain social distancing ( ? 2 meters) cover mouth when coughing, and stay home if sick until better. Discussed how [facility] is providing Telehealth psychotherapy services using the Zoom Cloud Meetings Platform in a safe, secure and high-quality format. Discussed using the [employer] Cloud Meeting Platform if needed. He reported that he would not harm himself and did not intend to harm himself or another.   Psychosocial and environmental problems addressed: Discussed with Mr. Doan how he was taking two classes at [employer] and that one class that starts in March was on Career Development. Discussed how that class will give him an opportunity to discover meaningful interests and areas of work to explore. Discussed how there may be cultural and language barriers to his father’s approach to problem-solving. His parents are from [place] and their dominant language is Farsi.    Current GAF: 50   Current medications: None.   Significant/ Recent Events: Discussed with Mr. Doan strategies to manage his anxieties. Discussed strategies to manage his temper constructively. Discussed how this psychotherapy would be informed by Polyvagal Theory, Positive Psychology (Mindful Self-Compassion), and he will develop Cognitive Behavioral Skills and Strategies to manage his anxieties and his temper and find meaning and balance in his life. He asked to return to [facility] weekly do develop this therapy. Discussed how this psychotherapy would focus on his wellbeing and progress at his pace and direction.   Informed consent issues discussed: Mr. Doan reported that he understood the process of this evaluation agreed to return to [facility].    Assignments/ Homework: Mr. Doan agreed to return to [facility] to develop this supportive psychotherapy.     Plan: Mr. Doan agreed to closely monitor his mood and behavior. He agreed to implement the strategies discussed.   Diagnoses: F41.1 Generalized Anxiety Disorder [] Provisional   Treatment Plan: []  Continued [x] New [] Replaced Referral:   Suicidal [] Yes [x] No [] Medical:    Violence: [] Yes [x] No [] Psychiatric:    Next session:     [] Neurological:            Justin Bruno Psy.D.  Clinical Psychologist  [facility]  NPI: [id number]

## 2022-02-10 ENCOUNTER — OFFICE VISIT (OUTPATIENT)
Dept: BEHAVIORAL HEALTH | Facility: CLINIC | Age: 22
End: 2022-02-10
Payer: COMMERCIAL

## 2022-02-10 DIAGNOSIS — F41.9 ANXIETY: ICD-10-CM

## 2022-02-10 PROCEDURE — 90837 PSYTX W PT 60 MINUTES: CPT | Performed by: PSYCHOLOGIST

## 2022-02-11 NOTE — PROGRESS NOTES
Name: Syd Doan Date: 2/10/22 : 99 15 30 60? 75   [x] Initial Dx Eval [] Family [] Cancelled/Rescheduled [] Time in session 60 minutes   [] Individual [] Group [] Late Cancellation [] Virtual Session   [] Couple [] Testing [] No call/No show [] Late   [] Discharge [] Phone [x] On time: 4:00 PM [x]  (2)   Attended: Mr. Doan (He wore a mask)   Observations/ Appearance/ Affect: Mr. Doan appeared clean, well groomed, and clean and dressed in his work scrubs. He was oriented to person, place, time and purpose, was pleasant and cooperative.  His affect was anxious and mood was euthymic. No suicidal or homicidal ideation described or reported. No reports of hallucinations or confusions. He participated well in this session.   Content/ Topics: Discussed with Mr. Doan how this psychologist knew him. He reported that he also realized after last session that he went to school with this psychologist’s son. It was a pleasant discovery and he reported that he was comfortable with working with this psychologist. He reported that he paid some attention to his eating habits and noticed that he had a tendency to be irritable if he did not eat his lunch. He reported that sometimes he would not eat his lunch because a co-worker would ask him to do something. He reported that he had difficulty saying no to people because he felt that it made him look selfish or uncooperative or not a good co-worker. He reported that he never asks others for favors for the same reasons. He reported that he had no problems with his temper this week.    Risk issues assessed: Discussed with Mr. Doan how this psychologist validated that experience. Discussed and practiced assertiveness in the session. Discussed how the symptoms of anxiety and stress are often observed and interpreted as marked disruptions to the Social Engagement System. Discussed how these shifts in physiological and behavioral states, distorted social awareness and  established habitual defensive reactions replaced appropriate spontaneous social behavior. Discussed with Mr. Doan how over the next several sessions he will learn how the body is wired for survival and connection. Discussed how the autonomic nervous system works as our personal surveillance system, always on guard, always alert to the question: “is it safe?” The work of the autonomic nervous system is to protect us by looking for cues safety and risk, sensing moment to moment of what is happening in and around our bodies and in the connections, we have to others. Discussed how over the next several sessions we will talk more about how this system works and can inform us about how the state of the nervous system sets the table for how we will respond, act and feel. Discussed with Mr. Doan his current concerns regarding the Coronavirus and his concerns about contagion. Discussed how any information about this virus may be found on the Center for Disease Control website or the [facility] Coronavirus Resource Center (https://coronavirus.[facility]/) for accurate information about it. Discussed how recommended precautions seemed prudent: wash hand frequently, avoid crowds, wear masks when out and about, maintain social distancing ( ? 2 meters) cover mouth when coughing, and stay home if sick until better. Discussed how [facility] is providing Telehealth psychotherapy services using the Zoom Cloud Meetings Platform in a safe, secure and high-quality format. Discussed using the Zoom Cloud Meeting Platform if needed. He reported that he would not harm himself and did not intend to harm himself or another.   Psychosocial and environmental problems addressed: Reminded Mr. Doan how he has been in a state of anxiety for a while now: and he is highly attuned to cues of danger, real or imagined. The compromised nervous system is locked in early adaptive survival responses shaped by experiences that may tell a story of survival that  persists automatically. Discussed how the re-patterning the autonomic nervous system is possible. Ongoing experiences continue to shape the autonomic nervous system. Our approach involves interrupting the traumatic pattern with experiences that exercise the neural circuits of connection. Developing a state of connection from a state of protection makes restoration possible. Discussed with Mr. Doan how he will learn strategies to increase ventral vagal moments at home. Encouraged him to be aware of his feelings of safety and connection here at [facility]. Discussed with Mr. Doan how he was taking two classes at [employer] and that one class that starts in March was on Career Development. Discussed how that class will give him an opportunity to discover meaningful interests and areas of work to explore. Discussed Philip Yee’s Self-Directed Search as a strategy he will learn that will help him explore his career options and goals.   Current GAF: 50   Current medications: None.   Significant/ Recent Events: Discussed how with Mr. Doan from a state of safety he will begin to learn skills of connection and safety. Discussed how we will work in the Dialectical Behavior Therapy Skills Training by Hoa Alvarez. Discussed how part one was written to help teenagers but the training translates well for helping adults, Discussed how we will focus on the skills from each module slowly until they are mastered. Discussed how the modules in the DBT training are done in sequence: Mindfulness, Distress Tolerance, Emotional Regulation, and Interpersonal Effectiveness. Discussed how this would be an individualized DBT program. Discussed how we will work through this workbook carefully starting the next session. Discussed how this psychotherapy would be informed by Polyvagal Theory, Positive Psychology (Mindful Self-Compassion), and he will develop Cognitive Behavioral Skills and Strategies to manage his anxieties and fears and  find meaning and balance in his life. He asked to return to [facility] weekly do develop this therapy. Reminded him how this psychotherapy would focus on his wellbeing and progress at his pace and direction.   Informed consent issues discussed: Mr. Doan reported that he understood the process of this evaluation agreed to return to [facility].    Assignments/ Homework: Mr. Doan agreed to return to [facility] to develop this supportive psychotherapy.     Plan: Mr. Doan agreed to closely monitor his mood and behavior. He agreed to continue to monitor his diet and sleep.   Diagnoses: F41.1 Generalized Anxiety Disorder [] Provisional   Treatment Plan: [] Continued [x] New [] Replaced Referral:   Suicidal [] Yes [x] No [] Medical:    Violence: [] Yes [x] No [] Psychiatric:    Next session:     [] Neurological:            Justin Bruno Psy.D.  Clinical Psychologist  [facility]  NPI: [id number]

## 2022-02-16 ENCOUNTER — APPOINTMENT (OUTPATIENT)
Dept: SLEEP MEDICINE | Facility: MEDICAL CENTER | Age: 22
End: 2022-02-16
Payer: COMMERCIAL

## 2022-02-18 ENCOUNTER — OFFICE VISIT (OUTPATIENT)
Dept: BEHAVIORAL HEALTH | Facility: CLINIC | Age: 22
End: 2022-02-18
Payer: COMMERCIAL

## 2022-02-18 DIAGNOSIS — F41.1 GENERALIZED ANXIETY DISORDER: ICD-10-CM

## 2022-02-18 PROCEDURE — 90837 PSYTX W PT 60 MINUTES: CPT | Performed by: PSYCHOLOGIST

## 2022-02-19 NOTE — PROGRESS NOTES
Name: Syd Doan Date: 22 : 99 15 30 60? 75   [] Initial Dx Eval [] Family [] Cancelled/Rescheduled [] Time in session 60 minutes   [x] Individual [] Group [] Late Cancellation [] Virtual Session   [] Couple [] Testing [] No call/No show [] Late   [] Discharge [] Phone [x] On time: 3:00 PM [x]  (3)   Attended: Mr. Doan (He wore a mask)   Observations/ Appearance/ Affect: Mr. Doan appeared clean, well groomed, and clean and dressed in his work scrubs. He was oriented to person, place, time and purpose, was pleasant and cooperative.  His affect was anxious and mood was euthymic. No suicidal or homicidal ideation described or reported. No reports of hallucinations or confusions. He participated well in this session.   Content/ Topics: Mr. Doan reported that he was doing well this week. He reported that he was making effort to work the skills in the Mindfulness Module. He reported that he wanted to work on his relationship with his parents. He reported that he was not disciplined as a child and even though his parents tried to discipline him, they could not discipline him. He reported that he would argue and fight them. He reported that he would curse at them and he would even hit his mother. He reported that he wanted a better and healthier relationship with his parents. He reported that on the days when he goes to the gym to work out after work he is able to be calm around them when he gets home and is much less likely to be irritated our agitated. He reported that sometimes he is tired and hungry and if he eats something he can be less likely to be irritated or agitated.    Risk issues assessed: Discussed with Mr. Doan how this psychologist validated that experience. Discussed how changing his relationship with his parents to be constructive and pleasant and not be aggressive and destructive is possible. Discussed how he will learn skills in this session to change his behavior. Discussed how  the symptoms of anxiety and stress are often observed and interpreted as marked disruptions to the Social Engagement System. Discussed how these shifts in physiological and behavioral states, distorted social awareness and established habitual defensive reactions replaced appropriate spontaneous social behavior. Discussed with Mr. Doan how over the next several sessions he will learn how the body is wired for survival and connection. Discussed how the autonomic nervous system works as our personal surveillance system, always on guard, always alert to the question: “is it safe?” The work of the autonomic nervous system is to protect us by looking for cues safety and risk, sensing moment to moment of what is happening in and around our bodies and in the connections, we have to others. Discussed how over the next several sessions we will talk more about how this system works and can inform us about how the state of the nervous system sets the table for how we will respond, act and feel. He reported that he would not harm himself or another person and did not intend to harm himself or another. Using the empty-chair technique he recognized that he felt remorse and guilt for his behavior and how his parents may also have similar feelings.    Psychosocial and environmental problems addressed: Reminded Mr. Doan how he has been in a state of anxiety for a while now: and he is highly attuned to cues of danger, real or imagined. The compromised nervous system is locked in early adaptive survival responses shaped by experiences that may tell a story of survival that persists automatically. Discussed how the re-patterning the autonomic nervous system is possible. Ongoing experiences continue to shape the autonomic nervous system. Our approach involves interrupting the traumatic pattern with experiences that exercise the neural circuits of connection. Developing a state of connection from a state of protection makes restoration  possible. Discussed with Mr. Doan how he will learn strategies to increase ventral vagal moments at home. Encouraged him to be aware of his feelings of safety and connection here at [facility]. Discussed how working out at the gym creates a state of relaxation and how mindful meditation does something similar. Discussed how water therapy (like a cold shower), sound therapy (like listening to relaxing music) and diet (eating a healthy meal) help to create state of relaxation and helps a person to enter into and maintain a ventral state of connection and safety.   Current GAF: 50   Current medications: None.   Significant/ Recent Events: Discussed how with Mr. Doan from a state of safety he will begin to learn skills of connection and safety. Discussed how we will work in the Dialectical Behavior Therapy Skills Training by Hoa Alvarez. Discussed how part one was written to help teenagers but the training translates well for helping adults, Discussed how we will focus on the skills from each module slowly until they are mastered. Discussed how the modules in the DBT training are done in sequence: Mindfulness, Distress Tolerance, Emotional Regulation, and Interpersonal Effectiveness. Discussed how this would be an individualized DBT program. Discussed how we will work through this workbook carefully starting the next session. Discussed how this psychotherapy would be informed by Polyvagal Theory, Positive Psychology (Mindful Self-Compassion), and he will develop Cognitive Behavioral Skills and Strategies to manage his anxieties and fears and find meaning and balance in his life. He asked to return to [facility] weekly do develop this therapy. Reminded him how this psychotherapy would focus on his wellbeing and progress at his pace and direction. He agreed to review the Mindfulness Module and that the next session we will review the Distress Tolerance Module. Encouraged him to get the book, “Dialectical Behavior Therapy Skills Training  with Adolescents: A Practical Workbook for Therapists, Teens and Parents”, By Geronimo Alvarez    Informed consent issues discussed: Mr. Doan reported that he understood the process of this evaluation agreed to return to [facility].    Assignments/ Homework: Mr. Doan agreed to return to [facility] to develop this supportive psychotherapy.     Plan: Mr. Doan agreed to closely monitor his mood and behavior. He agreed to continue to monitor his diet and sleep and practice some of the skills discussed today.    Diagnoses: F41.1 Generalized Anxiety Disorder [] Provisional   Treatment Plan: [] Continued [x] New [] Replaced Referral:   Suicidal [] Yes [x] No [] Medical:    Violence: [] Yes [x] No [] Psychiatric:    Next session:     [] Neurological:            Justin Bruno Psy.D.  Clinical Psychologist  [facility]  NPI: [id number]

## 2022-02-24 NOTE — PROGRESS NOTES
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 who elicited a history of snoring, insufficient sleep, and excessive daytime somnolence    Schedule:    Bed partner: yes  To bed: 10PM  Sleep Latency: 10-15Up: 5AM  Work Hours: 8-4:30  Naps: rarely  Caffeine: used  Etoh: drink a month  Total [place] score: 12      Symptom Summary:    Sleep onset insomnia: -  Nocturnal awakenings:   Nocturia: -  Trouble falling back asleep after awakening: -  Snoring: loud   Witnessed apneas: - as a kid only  Resuscitative snorts:   Nocturnal shortness of breath: -  Nocturnal headaches: -  Limb movements during sleep: ?  Restless legs: -  Non-restorative sleep: +  Morning headaches: -  EDS: -  Fatigue: -  Tiredness: -  Falls asleep accidentally: + almost got in an accident  Returning to bed after arising:   Irritability: yes  Memory problems: yes  Difficulty concentrating: yes  Work performance problems: yes, may fall asleep at his desk      Significant comorbidities and modifying factors include 12/21 COVID 19 infection, obesity, social anxiety disorder, elevated SGOT, depression, and need for SARS-CoV-2 booster.      Patient Active Problem List    Diagnosis Date Noted   • Anxiety 02/10/2022   • Moderate episode of recurrent major depressive disorder (HCC) 02/02/2022   • Generalized anxiety disorder 02/02/2022   • Depression 12/02/2021   • Elevated AST (SGOT) 12/02/2021   • Social anxiety disorder 12/02/2021   • HEMANT (obstructive sleep apnea) 12/02/2021   • Obesity (BMI 30-39.9) 09/20/2021       Past Medical History:   Diagnosis Date   • Anxiety    • Daytime sleepiness    • Depression    • Eating disorder    • Snoring         History reviewed. No pertinent surgical history.    Family History   Problem Relation Age of Onset   • Obesity Mother    • Schizophrenia Sister        Social History     Socioeconomic History   • Marital status: Single     Spouse name: Not on file  "  • Number of children: Not on file   • Years of education: Not on file   • Highest education level: Not on file   Occupational History   • Not on file   Tobacco Use   • Smoking status: Never Smoker   • Smokeless tobacco: Never Used   Vaping Use   • Vaping Use: Never used   Substance and Sexual Activity   • Alcohol use: Yes     Alcohol/week: 0.0 oz     Comment: Occasional   • Drug use: Never   • Sexual activity: Not on file   Other Topics Concern   • Not on file   Social History Narrative   • Not on file     Social Determinants of Health     Financial Resource Strain: Not on file   Food Insecurity: Not on file   Transportation Needs: Not on file   Physical Activity: Not on file   Stress: Not on file   Social Connections: Not on file   Intimate Partner Violence: Not on file   Housing Stability: Not on file       Current Outpatient Medications   Medication Sig Dispense Refill   • zolpidem (AMBIEN) 5 MG Tab Take 1 Tablet by mouth at bedtime as needed for Sleep for up to 1 day. 2 Tablet 0   • fluoxetine (PROZAC) 40 MG capsule Take 40 mg by mouth every day.       No current facility-administered medications for this visit.    \"CURRENT RX\"    ALLERGIES: Patient has no known allergies.    ROS    Constitutional: Denies fever, chills, sweats,  weight loss, fatigue.  Eyes: Denies vision loss, pain, drainage, double vision, glasses.  Ears/Nose/Mouth/Throat: Denies earache, difficulty hearing, rhinitis/nasal congestion, injury, recurrent sore throat, persistent hoarseness, decayed teeth/toothaches, ringing or buzzing in the ears.  Cardiovascular: Denies chest pain, tightness, palpitations, swelling in legs/feet, fainting, difficulty breathing when lying down but gets better when sitting up.   Respiratory: Denies shortness of breath, cough, sputum, wheezing, painful breathing, coughing up blood.   Sleep: per HPI  Gastrointestinal: Denies  difficulty swallowing, nausea, abdominal pain, diarrhea, constipation, " "heartburn.  Genitourinary: Denies  blood in urine, discharge, frequent urination.   Musculoskeletal: Denies painful joints, sore muscles, back pain.   Integumentary: Denies rashes, lumps, color changes.   Neurological: Denies frequent headaches,weakness, dizziness.    PHYSICAL EXAM  Obese    /84 (BP Location: Right arm, Patient Position: Sitting, BP Cuff Size: Adult)   Pulse 87   Resp 16   Ht 1.702 m (5' 7\")   Wt 98 kg (216 lb)   SpO2 96%   BMI 33.83 kg/m²   Appearance: Well-nourished, well-developed, no acute distress  Eyes:  PERRLA, EOMI  ENMT: masked  Neck: Supple, trachea midline, no masses  Respiratory effort:  No intercostal retractions or use of accessory muscles  Lung auscultation:  No wheezes rhonchi rubs or rales  Cardiac: No murmurs, rubs, or gallops; regular rhythm, normal rate; no edema  Abdomen:  No tenderness, no organomegaly> Obese  Musculoskeletal:  Grossly normal; gait and station normal; digits and nails normal  Skin:  No rashes, petechiae, cyanosis  Neurologic: without focal signs; oriented to person, time, place, and purpose; judgement intact  Psychiatric:  No depression, anxiety, agitation        Medical Decision Making:  The medical record was reviewed in its entirety including the referral notes, records from primary care, consultants notes, hospital records, labs, imaging, microbiology, immunology, and immunizations.  Care gaps identified and reviewed with the patient.    Diagnostic and titration nocturnal polysomnograms, home sleep apnea tests, continuous nocturnal oximetry results, multiple sleep latency tests, and compliance reports reviewed.        1. HEMANT (obstructive sleep apnea)  - zolpidem (AMBIEN) 5 MG Tab; Take 1 Tablet by mouth at bedtime as needed for Sleep for up to 1 day.  Dispense: 2 Tablet; Refill: 0  - Polysomnography, 4 or More; Future    Other orders  - fluoxetine (PROZAC) 40 MG capsule; Take 40 mg by mouth every day.      PLAN:   The patient has signs and " symptoms consistent with obstructive sleep apnea hypopnea syndrome. Will schedule a nocturnal polysomnogram or a home sleep apnea test depending on signs and symptoms as well as insurance restrictions.    The risks of untreated sleep apnea were discussed with the patient at length. Patients with HEMANT are at increased risk of cardiovascular disease including coronary artery disease, systemic arterial hypertension, pulmonary arterial hypertension, cardiac arrythmias, and stroke. HEMANT patients have an increased risk of motor vehicle accidents, type 2 diabetes, chronic kidney disease, and non-alcoholic liver disease. The patient was advised to avoid driving a motor vehicle when drowsy.    Positive airway pressure will favorably impact many of the adverse conditions and effects provoked by HEMANT.    Have advised the patient to follow up with the appropriate healthcare practitioners for all other medical problems and issues. Discussed blood pressure management if needed. Discussed depression screening.    N95 mask wearing, handwashing, and social distancing protocols reviewed and encouraged.      Return in about 10 weeks (around 5/11/2022).    Total time 45 minutes

## 2022-02-25 ENCOUNTER — OFFICE VISIT (OUTPATIENT)
Dept: BEHAVIORAL HEALTH | Facility: CLINIC | Age: 22
End: 2022-02-25
Payer: COMMERCIAL

## 2022-02-25 DIAGNOSIS — F41.9 ANXIETY: ICD-10-CM

## 2022-02-25 DIAGNOSIS — F41.1 GENERALIZED ANXIETY DISORDER: ICD-10-CM

## 2022-02-25 PROCEDURE — 90837 PSYTX W PT 60 MINUTES: CPT | Performed by: PSYCHOLOGIST

## 2022-02-25 ASSESSMENT — ANXIETY QUESTIONNAIRES
3. WORRYING TOO MUCH ABOUT DIFFERENT THINGS: MORE THAN HALF THE DAYS
6. BECOMING EASILY ANNOYED OR IRRITABLE: SEVERAL DAYS
2. NOT BEING ABLE TO STOP OR CONTROL WORRYING: SEVERAL DAYS
5. BEING SO RESTLESS THAT IT IS HARD TO SIT STILL: MORE THAN HALF THE DAYS
4. TROUBLE RELAXING: SEVERAL DAYS
7. FEELING AFRAID AS IF SOMETHING AWFUL MIGHT HAPPEN: MORE THAN HALF THE DAYS
GAD7 TOTAL SCORE: 11
1. FEELING NERVOUS, ANXIOUS, OR ON EDGE: MORE THAN HALF THE DAYS
IF YOU CHECKED OFF ANY PROBLEMS ON THIS QUESTIONNAIRE, HOW DIFFICULT HAVE THESE PROBLEMS MADE IT FOR YOU TO DO YOUR WORK, TAKE CARE OF THINGS AT HOME, OR GET ALONG WITH OTHER PEOPLE: SOMEWHAT DIFFICULT

## 2022-02-25 ASSESSMENT — PATIENT HEALTH QUESTIONNAIRE - PHQ9
CLINICAL INTERPRETATION OF PHQ2 SCORE: 2
5. POOR APPETITE OR OVEREATING: 2 - MORE THAN HALF THE DAYS
SUM OF ALL RESPONSES TO PHQ QUESTIONS 1-9: 12

## 2022-02-26 NOTE — PROGRESS NOTES
Name: Syd Doan Date: 22 : 99 15 30 60 75?   [] Initial Dx Eval [] Family [] Cancelled/Rescheduled [] Time in session 75minutes   [x] Individual [] Group [] Late Cancellation [] Virtual Session   [] Couple [] Testing [] No call/No show [] Late   [] Discharge [] Phone [x] On time: 3:00 PM [x]  (4)   Attended: Mr. Doan (He wore a mask)   Observations/ Appearance/ Affect: Mr. Doan appeared clean, well groomed, and clean and dressed in his work scrubs. He was oriented to person, place, time and purpose, was pleasant and cooperative.  His affect was anxious and mood was euthymic. No suicidal or homicidal ideation described or reported. No reports of hallucinations or confusions. He participated well in this session.   Content/ Topics: Mr. Doan reported that he was doing well this week. He reported that he took some time off this week and went to [facility] for a concert. He reported that they had a very good time and really enjoy the show and their time together. He reported that he did not really look at the Mindfulness Module. He reported that he noticed that when he got back from the concern he was more aware of how he goes into a survival mode when he was home with his parents. He reported that this time he noticed how “juvenile” he acted and how this time he would grunt and growl instead of saying anything negative. He reported that he did sit down with his mother and talked to her about how bad he felt about how he had treated her in the past and how was trying to change that. He reported that she was forgiving and supportive. He reported that he had a hard time forgiving himself. Discussed how the practice of savoring this moment of connection was important. Discussed how bringing active awareness to moments of connection and safety help to interrupt the pattern of negativity and support positive change: savoring the state of connection and savor the experience of safety. He agreed  to work in the Mindfulness Module today and previewing the Distress Tolerance Module.    Risk issues assessed: Discussed how changing his relationship with his parents to be constructive and pleasant and not be aggressive and destructive is possible. Discussed how DBT strategies and skills will help him change his behavior. Discussed how the symptoms of anxiety and stress are often observed and interpreted as marked disruptions to the Social Engagement System. Discussed how these shifts in physiological and behavioral states, distorted social awareness and established habitual defensive reactions replaced appropriate spontaneous social behavior. Discussed with Mr. Doan how over the next several sessions he will learn how the body is wired for survival and connection. Discussed how the autonomic nervous system works as our personal surveillance system, always on guard, always alert to the question: “is it safe?” The work of the autonomic nervous system is to protect us by looking for cues safety and risk, sensing moment to moment of what is happening in and around our bodies and in the connections, we have to others. Discussed how over the next several sessions we will talk more about how this system works and can inform us about how the state of the nervous system sets the table for how we will respond, act and feel. He reported that he would not harm himself or another person and did not intend to harm himself or another.    Psychosocial and environmental problems addressed: Reminded Mr. Doan how he has been in a state of anxiety for a while now: and he is highly attuned to cues of danger, real or imagined. The compromised nervous system is locked in early adaptive survival responses shaped by experiences that may tell a story of survival that persists automatically. Discussed how the re-patterning the autonomic nervous system is possible. Ongoing experiences continue to shape the autonomic nervous system. Our  approach involves interrupting the traumatic pattern with experiences that exercise the neural circuits of connection. Developing a state of connection from a state of protection makes restoration possible. Discussed with Mr. Doan how he will learn strategies to increase ventral vagal moments at home. Encouraged him to be aware of his feelings of safety and connection here at [facility].    Current GAF: 50   Current medications: None.   Significant/ Recent Events: Discussed Dialectics and started learning the language of DBT. Discussed the concepts of acceptance and change and the need for balance. Discussed the States of Mind and how DBT uses these states to develop perspective as precursors to change. Discussed how it DBT the work is to be in [facility] to demonstrate self-care, and mindfulness and the ability to act effectively. Previewed the Distress Tolerance Skills and reviewed [employer] ACCEPTS. Discussed how we will work through this workbook carefully. Discussed how this psychotherapy would be informed by Polyvagal Theory, Positive Psychology (Mindful Self-Compassion), and he will develop Cognitive Behavioral Skills and Strategies to manage his anxieties and fears and find meaning and balance in his life. He asked to return to [facility] weekly do develop this therapy. Reminded him how this psychotherapy would focus on his wellbeing and progress at his pace and direction. He agreed to complete some of the Mindfulness Module exercises and preview the Distress Tolerance Module. Encouraged him to get the book, “Dialectical Behavior Therapy Skills Training with Adolescents: A Practical Workbook for Therapists, Teens and Parents”, By Geronimo Alvarez    Informed consent issues discussed: Mr. Doan reported that he understood the process of this evaluation agreed to return to [facility].    Assignments/ Homework: Mr. Doan agreed to return to [facility] to develop this supportive psychotherapy.     Plan: Mr. Doan agreed to closely monitor his  mood and behavior. He agreed to continue to monitor his diet and sleep and practice some of the skills discussed today.    Diagnoses: F41.1 Generalized Anxiety Disorder [] Provisional   Treatment Plan: [] Continued [x] New [] Replaced Referral:   Suicidal [] Yes [x] No [] Medical:    Violence: [] Yes [x] No [] Psychiatric:    Next session:     [] Neurological:            Justin Bruno Psy.D.  Clinical Psychologist  [facility]  NPI: [id number]

## 2022-03-02 ENCOUNTER — OFFICE VISIT (OUTPATIENT)
Dept: SLEEP MEDICINE | Facility: MEDICAL CENTER | Age: 22
End: 2022-03-02
Payer: COMMERCIAL

## 2022-03-02 VITALS
RESPIRATION RATE: 16 BRPM | BODY MASS INDEX: 33.9 KG/M2 | SYSTOLIC BLOOD PRESSURE: 126 MMHG | HEIGHT: 67 IN | HEART RATE: 87 BPM | WEIGHT: 216 LBS | DIASTOLIC BLOOD PRESSURE: 84 MMHG | OXYGEN SATURATION: 96 %

## 2022-03-02 DIAGNOSIS — G47.33 OSA (OBSTRUCTIVE SLEEP APNEA): ICD-10-CM

## 2022-03-02 PROCEDURE — 99204 OFFICE O/P NEW MOD 45 MIN: CPT | Performed by: INTERNAL MEDICINE

## 2022-03-02 RX ORDER — FLUOXETINE HYDROCHLORIDE 40 MG/1
40 CAPSULE ORAL DAILY
COMMUNITY
End: 2022-03-30 | Stop reason: SDUPTHER

## 2022-03-02 RX ORDER — ZOLPIDEM TARTRATE 5 MG/1
5 TABLET ORAL NIGHTLY PRN
Qty: 2 TABLET | Refills: 0 | Status: SHIPPED | OUTPATIENT
Start: 2022-03-02 | End: 2022-03-03

## 2022-03-02 ASSESSMENT — FIBROSIS 4 INDEX: FIB4 SCORE: 1.28

## 2022-03-04 ENCOUNTER — OFFICE VISIT (OUTPATIENT)
Dept: BEHAVIORAL HEALTH | Facility: CLINIC | Age: 22
End: 2022-03-04
Payer: COMMERCIAL

## 2022-03-04 DIAGNOSIS — F41.1 GENERALIZED ANXIETY DISORDER: ICD-10-CM

## 2022-03-04 PROCEDURE — 90837 PSYTX W PT 60 MINUTES: CPT | Performed by: PSYCHOLOGIST
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
## 2022-03-08 ENCOUNTER — APPOINTMENT (OUTPATIENT)
Dept: BEHAVIORAL HEALTH | Facility: CLINIC | Age: 22
End: 2022-03-08
Payer: COMMERCIAL

## 2022-03-11 ENCOUNTER — APPOINTMENT (OUTPATIENT)
Dept: BEHAVIORAL HEALTH | Facility: CLINIC | Age: 22
End: 2022-03-11
Payer: COMMERCIAL

## 2022-03-18 ENCOUNTER — SLEEP STUDY (OUTPATIENT)
Dept: SLEEP MEDICINE | Facility: MEDICAL CENTER | Age: 22
End: 2022-03-18
Attending: INTERNAL MEDICINE
Payer: COMMERCIAL

## 2022-03-18 ENCOUNTER — OFFICE VISIT (OUTPATIENT)
Dept: BEHAVIORAL HEALTH | Facility: CLINIC | Age: 22
End: 2022-03-18
Payer: COMMERCIAL

## 2022-03-18 DIAGNOSIS — G47.33 OSA (OBSTRUCTIVE SLEEP APNEA): ICD-10-CM

## 2022-03-18 DIAGNOSIS — F41.1 GENERALIZED ANXIETY DISORDER: ICD-10-CM

## 2022-03-18 PROCEDURE — 95810 POLYSOM 6/> YRS 4/> PARAM: CPT | Performed by: INTERNAL MEDICINE

## 2022-03-18 PROCEDURE — 90837 PSYTX W PT 60 MINUTES: CPT | Performed by: PSYCHOLOGIST

## 2022-03-21 ASSESSMENT — PATIENT HEALTH QUESTIONNAIRE - PHQ9
5. POOR APPETITE OR OVEREATING: 2 - MORE THAN HALF THE DAYS
SUM OF ALL RESPONSES TO PHQ QUESTIONS 1-9: 11
CLINICAL INTERPRETATION OF PHQ2 SCORE: 2

## 2022-03-21 ASSESSMENT — ANXIETY QUESTIONNAIRES
6. BECOMING EASILY ANNOYED OR IRRITABLE: SEVERAL DAYS
IF YOU CHECKED OFF ANY PROBLEMS ON THIS QUESTIONNAIRE, HOW DIFFICULT HAVE THESE PROBLEMS MADE IT FOR YOU TO DO YOUR WORK, TAKE CARE OF THINGS AT HOME, OR GET ALONG WITH OTHER PEOPLE: SOMEWHAT DIFFICULT
7. FEELING AFRAID AS IF SOMETHING AWFUL MIGHT HAPPEN: SEVERAL DAYS
GAD7 TOTAL SCORE: 9
5. BEING SO RESTLESS THAT IT IS HARD TO SIT STILL: SEVERAL DAYS
2. NOT BEING ABLE TO STOP OR CONTROL WORRYING: SEVERAL DAYS
1. FEELING NERVOUS, ANXIOUS, OR ON EDGE: MORE THAN HALF THE DAYS
3. WORRYING TOO MUCH ABOUT DIFFERENT THINGS: MORE THAN HALF THE DAYS
4. TROUBLE RELAXING: SEVERAL DAYS

## 2022-03-21 NOTE — PROGRESS NOTES
Name: Syd Doan Date: 3/18/22  99 Time in session 60 minutes   [] Initial Dx Eval [] Family [] Cancelled/Rescheduled [] Office visit   [x] Individual [] Group [] Late Cancellation [] Virtual Session   [] Couple [] Testing [] No call/No show [] Late   [] Discharge [] Phone [x] On time: 4:00 PM [x]  (6)   Attended: Mr. Doan (He wore a mask)   Observations/ Appearance/ Affect: Mr. Doan appeared clean, well groomed, and clean and dressed in his work scrubs. He was oriented to person, place, time and purpose, was pleasant and cooperative.  His affect was anxious and mood was euthymic. No suicidal or homicidal ideation described or reported. No reports of hallucinations or confusions. He participated well in this session.   Content/ Topics: Mr. Doan reported that he was doing well this week. He reported that he was using the half-smile skill to decrease stress. He reported that he found himself less irritable at home and more relaxed at work and with his friends. He reported that he was taking his lunch break at work later and that helped him to have a safe lunch, that others were not asking him to skip his lunch and he felt less hunger in the afternoon when he gets home. He reported that he was making a good effort to eat breakfast and was paying attention when and what he ate for dinner. He reported that he did not really look at the Mindfulness Module or the Distress Tolerance Module this week. Discussed with Mr. Doan how this psychotherapy would focus on understanding Polyvagal Theory and developing DBT skills. Discussed how the conversations with his friend and with his mother may trigger survival instinct. Discussed how in this psychotherapy the goal would be to stay ventral and from a state of safety, to use DBT skills to survive the moments skillfully and mindfully. He reported that he did use his DBT Skills this week.    Risk issues assessed: Discussed how changing his relationship with his  parents to be constructive and pleasant and not be aggressive and destructive is possible. Discussed how DBT strategies and skills will help him change his behavior. Discussed how the symptoms of anxiety and stress are often observed and interpreted as marked disruptions to the Social Engagement System. Discussed how these shifts in physiological and behavioral states, distorted social awareness and established habitual defensive reactions replaced appropriate spontaneous social behavior. Discussed with Mr. Doan how over the next several sessions he will learn how the body is wired for survival and connection. Discussed how the autonomic nervous system works as our personal surveillance system, always on guard, always alert to the question: “is it safe?” The work of the autonomic nervous system is to protect us by looking for cues safety and risk, sensing moment to moment of what is happening in and around our bodies and in the connections, we have to others. Discussed how over the next several sessions we will talk more about how this system works and can inform us about how the state of the nervous system sets the table for how we will respond, act and feel. PHQ-9 = 11; CRYSTAL-7 = 9 He reported that he would not harm himself or another person and did not intend to harm himself or another.    Psychosocial and environmental problems addressed: Discussed with Mr. Doan how he could use his What Skills to find his dialectics: and how to find his Wise Mind. Discussed how over the next several sessions he will review in these session the skills he is using and the skills he is learning in DBT. Reminded him how he has been in a state of anxiety and defensiveness for a very long time and it will take this kind of practice to stay ventral and using his DBT skills to re-pattern and act from a state of safety and connection. The compromised nervous system is locked in early adaptive survival responses shaped by experiences  that may tell a story of survival that persists automatically. Discussed how the re-patterning the autonomic nervous system is possible. Ongoing experiences continue to shape the autonomic nervous system. Our approach involves interrupting the traumatic pattern with experiences that exercise the neural circuits of connection. Developing a state of connection from a state of protection makes restoration possible. Discussed with Mr. Doan how he will learn strategies to increase ventral vagal moments at home. Encouraged him to be aware of his feelings of safety and connection here at [facility].    Current GAF: 55   Current medications: None.   Significant/ Recent Events: Discussed Dialectics and started learning the language of DBT. Discussed the concepts of acceptance and change and the need for balance. Discussed the States of Mind and how DBT uses these states to develop perspective as precursors to change. Discussed how in DBT the work is to be in Wise Mind to demonstrate self-care, and mindfulness and the ability to act effectively.  In this session we reviewed the Distress Tolerance Module and the Skills Sets there and how these skills are used to create a space so that a person can have some time to find the dialectic, use the What Skills and find their Wise Mind and decide which How Skills make the most sense. Discussed how he must learn to say the skills out loud: “I know what my Emotional Mind is thinking, It’s thinking _____.” And I know what my Logical Mind is thinking, “it is thinking _____.” “So my More Mind is telling me to _____.” He reviewed the Distress Tolerance Skills and reviewed Wise Mind ACCEPTS. Discussed how we will work through this workbook carefully. Discussed how this psychotherapy would be informed by Polyvagal Theory, Positive Psychology (Mindful Self-Compassion), and he will develop Cognitive Behavioral Skills and Strategies to manage his anxieties and fears and find meaning and balance in  his life. He asked to return to [facility] weekly do develop this therapy. Reminded him how this psychotherapy would focus on his wellbeing and progress at his pace and direction. He agreed to complete some of the Mindfulness Module exercises and preview the Distress Tolerance Module.    Informed consent issues discussed: Mr. Doan reported that he understood the process of this evaluation agreed to return to [facility]. Discussed how he expressed his desire to change and was willing to start the process. He reported that he was willing to make changes to his life toward a healthy lifestyle. He reported that he was aware of the problems he experienced and expressed the desire to solve those problems safely. He reported that he had a positive outlook for change. He reported that he was on his own right now but knew he needed to gain and maintain a healthy network of support.   Assignments/ Homework: Discussed with Mr. Doan how Ventral exercises help a person to be ventral and mindful. Discussed how a deep breath, a sigh, and a stretch are example of self-regulation. Discussed how keeping a diary is a way of demonstrating accountability and it is also showing an awareness of the need to practice these skills every day.    Plan: Mr. Doan agreed to closely monitor his mood and behavior. He agreed to continue to monitor his diet and sleep and practice some of the skills discussed today.    Diagnoses: F41.1 Generalized Anxiety Disorder [] Provisional   Treatment Plan: [x] Continued [] New [] Replaced Referral:   Suicidal [] Yes [x] No [] Medical:    Violence: [] Yes [x] No [] Psychiatric:    Next session:     [] Neurological:            Justin Bruno Psy.D.  Clinical Psychologist  [facility]  NPI: [id number]

## 2022-03-22 NOTE — PROCEDURES
MONTAGE: Standard    STUDY TYPE: Diagnostic    RECORDING TECHNIQUE:   After the scalp was prepared, gold plated electrodes were applied to the scalp according to the International 10-20 System. EEG (electroencephalogram) was continuously monitored from the O1-M2, O2-M1, C3-M2, C4-M1, F3-M2, and F4-M1. EOGs (electrooculograms) were monitored by electrodes placed at the left and right outer canthi. Chin EMG (electromyogram) was monitored by electrodes placed on the mentalis and sub-mentalis muscles. Nasal and oral airflow were monitored using a triple port thermocouple as well as oronasal pressure transducer. Respiratory effort was measured by inductive plethysmography technology employing abdominal and thoracic belts. Blood oxygen saturation and pulse were monitored by pulse oximetry. Heart rhythm was monitored by surface electrocardiogram. Leg EMG was studied using surface electrodes placed on left and right anterior tibialis. A microphone was used to monitor tracheal sounds and snoring. Body position was monitored and documented by technician observation.     SCORING CRITERIA:   A modification of the AASM manual for scoring of sleep and associated events was used. Obstructive apneas were scored by cessation of airflow for at least 10 seconds with continuing respiratory effort. Central apneas were scored by cessation of airflow for at least 10 seconds with no respiratory effort. Hypopneas were scored by a 30% or more reduction in airflow for at least 10 seconds accompanied by arterial oxygen desaturation of 3% or an arousal. For CMS (Medicare) patients, per AASM rule 1B, hypopneas are scored by 30% with mild reduction in airflow for at least 10 seconds accompanied by arterial saturation decreased at 4%.    Study start time was 10:44:08 PM. Diagnostic recording time was 7h 11.5m with a total sleep time of 6h 27.0m resulting in a sleep efficiency of 89.69%%. Sleep latency from the start of the study was 17 minutes and  the latency from sleep to REM was 189 minutes. In total,59 arousals were scored for an arousal index of 9.1.  Respiratory:  There were a total of 1 apneas consisting of 0 obstructive apneas, 0 mixed apneas, and 1 central apneas. A total of 85 hypopneas were scored. The apnea index was 0.16 per hour and the hypopnea index was 13.18 per hour resulting in an overall AHI of 13.33. AHI during rem was 33.6 and AHI while supine was 20.29.  Oximetry:  There was a mean oxygen saturation of 93.0%. The minimum oxygen saturation in NREM was 83.0 % and in REM was 82.0. The patient spent 3.9 minutes of TST with SaO2 <88%.  Cardiac:  The highest heart rate seen while awake was 91 BPM while the highest heart rate during sleep was 93 BPM with an average sleeping heart rate of 56 BPM.  Limb Movements:  There were a total of 4 PLMs during sleep which resulted in a PLMS index of 0.6. Of these, 6 were associated with arousals which resulted in a PLMS arousal index of 0.9.  Patient did not meet the split-night protocol secondary to insufficient number of qualifying respiratory events before 2 AM.        ASSESSMENT:    Mild obstructive sleep apnea hypopnea aggravated by REM sleep in the supine position - AHI 13.3, REM AHI 33.6, supine AHI 21.5  Mild nocturnal desaturation - luca saturation 82% - saturations less than or equal to 88% for 1.0% of the TST        RECOMMENDATION:    If significant signs, symptoms, and or comorbidities warrant, recommend a positive airway pressure titration. Alternative treatments for OSAH include behavioral modification, use of a dental appliance, and nasopharyngeal reconstructive surgery. Behavior modification includes weight loss, eliminating alcohol and sedatives, and avoiding the supine position. If alternative treatments are used, a follow-up diagnostic study is warranted to ensure efficacy.    Clinical correlation is needed.  An empiric challenge with auto titrating CPAP may be an acceptable  option.              Dr. Philip Vazquez MD

## 2022-03-24 ENCOUNTER — OFFICE VISIT (OUTPATIENT)
Dept: SLEEP MEDICINE | Facility: MEDICAL CENTER | Age: 22
End: 2022-03-24
Payer: COMMERCIAL

## 2022-03-24 VITALS
WEIGHT: 217 LBS | HEIGHT: 67 IN | OXYGEN SATURATION: 96 % | DIASTOLIC BLOOD PRESSURE: 76 MMHG | HEART RATE: 70 BPM | SYSTOLIC BLOOD PRESSURE: 118 MMHG | BODY MASS INDEX: 34.06 KG/M2 | RESPIRATION RATE: 16 BRPM

## 2022-03-24 DIAGNOSIS — G47.33 OSA (OBSTRUCTIVE SLEEP APNEA): ICD-10-CM

## 2022-03-24 PROCEDURE — 99214 OFFICE O/P EST MOD 30 MIN: CPT | Performed by: INTERNAL MEDICINE

## 2022-03-24 ASSESSMENT — FIBROSIS 4 INDEX: FIB4 SCORE: 1.28

## 2022-03-24 NOTE — PROGRESS NOTES
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 who elicited a history of snoring, insufficient sleep, and excessive daytime somnolence.First seen on 3/2/2022.  His sleep study was performed on 3/18/2022 and showed an overall AHI of 13.33, a REM AHI of 33.6, and a supine AHI of 21.5.  The luca saturation was 82%, and he spent 3.9 minutes of TST with saturations less than or equal to 88%.    Options for PAP therapy include an attended Pap titration versus an empiric challenge with auto titrating CPAP.  Significant comorbidities and modifying factors include 12/21 COVID 19 infection, obesity, social anxiety disorder, elevated SGOT, depression, and need for SARS-CoV-2 booster.       Patient Active Problem List    Diagnosis Date Noted   • Anxiety 02/10/2022   • Moderate episode of recurrent major depressive disorder (HCC) 02/02/2022   • Generalized anxiety disorder 02/02/2022   • Depression 12/02/2021   • Elevated AST (SGOT) 12/02/2021   • Social anxiety disorder 12/02/2021   • HEMANT (obstructive sleep apnea) 12/02/2021   • Obesity (BMI 30-39.9) 09/20/2021       Past Medical History:   Diagnosis Date   • Anxiety    • Daytime sleepiness    • Depression    • Eating disorder    • Snoring         History reviewed. No pertinent surgical history.    Family History   Problem Relation Age of Onset   • Obesity Mother    • Schizophrenia Sister        Social History     Socioeconomic History   • Marital status: Single     Spouse name: Not on file   • Number of children: Not on file   • Years of education: Not on file   • Highest education level: Not on file   Occupational History   • Not on file   Tobacco Use   • Smoking status: Never Smoker   • Smokeless tobacco: Never Used   Vaping Use   • Vaping Use: Never used   Substance and Sexual Activity   • Alcohol use: Yes     Alcohol/week: 0.0 oz     Comment: Occasional   • Drug use: Never   • Sexual activity: Not on file  "  Other Topics Concern   • Not on file   Social History Narrative   • Not on file     Social Determinants of Health     Financial Resource Strain: Not on file   Food Insecurity: Not on file   Transportation Needs: Not on file   Physical Activity: Not on file   Stress: Not on file   Social Connections: Not on file   Intimate Partner Violence: Not on file   Housing Stability: Not on file       Current Outpatient Medications   Medication Sig Dispense Refill   • fluoxetine (PROZAC) 40 MG capsule Take 40 mg by mouth every day.       No current facility-administered medications for this visit.    \"CURRENT RX\"    ALLERGIES: Patient has no known allergies.    ROS    Constitutional: Denies fever, chills, sweats,  weight loss, fatigue  Cardiovascular: Denies chest pain, tightness, palpitations, swelling in legs/feet, fainting, difficulty breathing when lying down but gets better when sitting up.   Respiratory: Denies shortness of breath, cough, sputum, wheezing, painful breathing, coughing up blood.   Sleep: per HPI  Gastrointestinal: Denies  difficulty swallowing, nausea, abdominal pain, diarrhea, constipation, heartburn.  Musculoskeletal: Denies painful joints, sore muscles, back pain.        PHYSICAL EXAM      /76 (BP Location: Left arm, Patient Position: Sitting, BP Cuff Size: Adult)   Pulse 70   Resp 16   Ht 1.702 m (5' 7\")   Wt 98.4 kg (217 lb)   SpO2 96%   BMI 33.99 kg/m²   Appearance: Well-nourished, well-developed, no acute distress  Eyes:  PERRLA, EOMI  ENMT: masked  Neck: Supple, trachea midline, no masses  Respiratory effort:  No intercostal retractions or use of accessory muscles  Lung auscultation:  No wheezes rhonchi rubs or rales  Cardiac: No murmurs, rubs, or gallops; regular rhythm, normal rate; no edema  Abdomen:  No tenderness, no organomegaly.  Musculoskeletal:  Grossly normal; gait and station normal; digits and nails normal  Skin:  No rashes, petechiae, cyanosis  Neurologic: without focal " signs; oriented to person, time, place, and purpose; judgement intact  Psychiatric:  No depression, anxiety, agitation      Medical Decision Making       The medical record was reviewed in its entirety including the referral notes, records from primary care, consultants notes, hospital records, lab, imaging, microbiology, immunology, and immunizations.  Care gaps identified and reviewed with the patient.    Diagnostic and titration nocturnal polysomnogram's, home sleep apnea tests, continuous nocturnal oximetry results, multiple sleep latency tests, and compliance reports reviewed.  1. HEMANT (obstructive sleep apnea)  - DME CPAP      PLAN:   His sleep study was performed on 3/18/2022 and showed an overall AHI of 13.33, a luca saturation of 82%, and 3.9 minutes of TST with saturations less than or equal to 88%.    Options for PAP therapy include an attended Pap titration versus an empiric challenge with auto titrating CPAP.  The patient prefers a challenge with auto titrating CPAP.  He will be placed on AutoPap 5 to 15 cm water using a mask of choice and heated humidification followed by clinical compliance review 31 to 90 days after receiving his equipment.    The risks of untreated sleep apnea were discussed with the patient at length. Patients with HEMANT are at increased risk of cardiovascular disease including coronary artery disease, systemic arterial hypertension, pulmonary arterial hypertension, cardiac arrythmias, and stroke. HEMANT patients have an increased risk of motor vehicle accidents, type 2 diabetes, chronic kidney disease, and non-alcoholic liver disease. The patient was advised to avoid driving a motor vehicle when drowsy.    Positive airway pressure will favorably impact many of the adverse conditions and effects provoked by HEMANT.    Have advised the patient to follow up with the appropriate healthcare practitioners for all other medical problems and issues.    N95 mask wearing, handwashing, and social  distancing protocols reviewed and encouraged.    Return in about 10 weeks (around 6/2/2022).    Total time 30 minutes

## 2022-03-25 ENCOUNTER — NON-PROVIDER VISIT (OUTPATIENT)
Dept: MEDICAL GROUP | Facility: PHYSICIAN GROUP | Age: 22
End: 2022-03-25
Payer: COMMERCIAL

## 2022-03-25 ENCOUNTER — HOSPITAL ENCOUNTER (OUTPATIENT)
Facility: MEDICAL CENTER | Age: 22
End: 2022-03-25
Attending: STUDENT IN AN ORGANIZED HEALTH CARE EDUCATION/TRAINING PROGRAM
Payer: COMMERCIAL

## 2022-03-25 ENCOUNTER — OFFICE VISIT (OUTPATIENT)
Dept: BEHAVIORAL HEALTH | Facility: CLINIC | Age: 22
End: 2022-03-25
Payer: COMMERCIAL

## 2022-03-25 DIAGNOSIS — Z11.4 SCREENING FOR HIV (HUMAN IMMUNODEFICIENCY VIRUS): ICD-10-CM

## 2022-03-25 DIAGNOSIS — E66.9 OBESITY (BMI 30-39.9): ICD-10-CM

## 2022-03-25 DIAGNOSIS — R74.01 ELEVATED AST (SGOT): ICD-10-CM

## 2022-03-25 DIAGNOSIS — F41.1 GENERALIZED ANXIETY DISORDER: ICD-10-CM

## 2022-03-25 DIAGNOSIS — Z11.59 NEED FOR HEPATITIS C SCREENING TEST: ICD-10-CM

## 2022-03-25 PROCEDURE — 80076 HEPATIC FUNCTION PANEL: CPT

## 2022-03-25 PROCEDURE — G0475 HIV COMBINATION ASSAY: HCPCS

## 2022-03-25 PROCEDURE — 36415 COLL VENOUS BLD VENIPUNCTURE: CPT | Performed by: STUDENT IN AN ORGANIZED HEALTH CARE EDUCATION/TRAINING PROGRAM

## 2022-03-25 PROCEDURE — 84443 ASSAY THYROID STIM HORMONE: CPT

## 2022-03-25 PROCEDURE — 84439 ASSAY OF FREE THYROXINE: CPT

## 2022-03-25 PROCEDURE — 90837 PSYTX W PT 60 MINUTES: CPT | Performed by: PSYCHOLOGIST

## 2022-03-25 PROCEDURE — 86803 HEPATITIS C AB TEST: CPT

## 2022-03-25 NOTE — PROGRESS NOTES
Pt was seated, confirmed pt name and , procedure explained to pt, venipuncture performed in LAC using aseptic technique, 1 8mL Lav, 4 gold, 1 green tubes collected, gauze placed with pressure on venipuncture site until hemostasis observed, site clean and dry, no redness or swelling observed, bandage placed, pt tolerated well and voiced no concerns.

## 2022-03-26 LAB
ALBUMIN SERPL BCP-MCNC: 4.3 G/DL (ref 3.2–4.9)
ALP SERPL-CCNC: 100 U/L (ref 30–99)
ALT SERPL-CCNC: 19 U/L (ref 2–50)
AST SERPL-CCNC: 15 U/L (ref 12–45)
BILIRUB CONJ SERPL-MCNC: <0.2 MG/DL (ref 0.1–0.5)
BILIRUB INDIRECT SERPL-MCNC: ABNORMAL MG/DL (ref 0–1)
BILIRUB SERPL-MCNC: 0.6 MG/DL (ref 0.1–1.5)
HCV AB SER QL: NORMAL
HIV 1+2 AB+HIV1 P24 AG SERPL QL IA: NORMAL
PROT SERPL-MCNC: 7.3 G/DL (ref 6–8.2)
T4 FREE SERPL-MCNC: 1.31 NG/DL (ref 0.93–1.7)
TSH SERPL DL<=0.005 MIU/L-ACNC: 0.43 UIU/ML (ref 0.38–5.33)

## 2022-03-28 ASSESSMENT — ANXIETY QUESTIONNAIRES
3. WORRYING TOO MUCH ABOUT DIFFERENT THINGS: MORE THAN HALF THE DAYS
4. TROUBLE RELAXING: SEVERAL DAYS
GAD7 TOTAL SCORE: 10
7. FEELING AFRAID AS IF SOMETHING AWFUL MIGHT HAPPEN: SEVERAL DAYS
1. FEELING NERVOUS, ANXIOUS, OR ON EDGE: SEVERAL DAYS
6. BECOMING EASILY ANNOYED OR IRRITABLE: MORE THAN HALF THE DAYS
5. BEING SO RESTLESS THAT IT IS HARD TO SIT STILL: MORE THAN HALF THE DAYS
2. NOT BEING ABLE TO STOP OR CONTROL WORRYING: SEVERAL DAYS
IF YOU CHECKED OFF ANY PROBLEMS ON THIS QUESTIONNAIRE, HOW DIFFICULT HAVE THESE PROBLEMS MADE IT FOR YOU TO DO YOUR WORK, TAKE CARE OF THINGS AT HOME, OR GET ALONG WITH OTHER PEOPLE: SOMEWHAT DIFFICULT

## 2022-03-28 ASSESSMENT — PATIENT HEALTH QUESTIONNAIRE - PHQ9
CLINICAL INTERPRETATION OF PHQ2 SCORE: 3
5. POOR APPETITE OR OVEREATING: 2 - MORE THAN HALF THE DAYS
SUM OF ALL RESPONSES TO PHQ QUESTIONS 1-9: 11

## 2022-03-28 NOTE — PROGRESS NOTES
Name: Syd Doan Date: 3/25/22  99 Time in session 60 minutes   [] Initial Dx Eval [] Family [] Cancelled/Rescheduled [] Office visit   [x] Individual [] Group [] Late Cancellation [] Virtual Session   [] Couple [] Testing [] No call/No show [] Late   [] Discharge [] Phone [x] On time: 3:00 PM [x]  (7)   Attended: Mr. Doan (He wore a mask)   Observations/ Appearance/ Affect: Mr. Doan appeared clean, well groomed, and clean and dressed in his work scrubs. He was oriented to person, place, time and purpose, was pleasant and cooperative.  His affect was anxious and mood was euthymic. No suicidal or homicidal ideation described or reported. No reports of hallucinations or confusions. He participated well in this session.   Content/ Topics: Mr. Doan reported that he was doing well this week. He reported that he and his friends were planning a trip to [place] at the end of the month. He reported that he was less irritable at home and at work. He reported that he was more relaxed with his friends. He reported that his family went on a short trim this week. He reported that he was sleeping better and was making an effort to eat healthier.     Risk issues assessed: Discussed how DBT strategies and skills will help him change his behavior. Discussed how the symptoms of anxiety and stress are often observed and interpreted as marked disruptions to the Social Engagement System. Discussed how these shifts in physiological and behavioral states, distorted social awareness and established habitual defensive reactions replaced appropriate spontaneous social behavior. Discussed with Mr. Doan how over the next several sessions he will learn how the body is wired for survival and connection. Discussed how the autonomic nervous system works as our personal surveillance system, always on guard, always alert to the question: “is it safe?” The work of the autonomic nervous system is to protect us by looking for cues  safety and risk, sensing moment to moment of what is happening in and around our bodies and in the connections, we have to others. Discussed how over the next several sessions we will talk more about how this system works and can inform us about how the state of the nervous system sets the table for how we will respond, act and feel. PHQ-9 = 11; CRYSTAL-7 = 10 He reported that he would not harm himself or another person and did not intend to harm himself or another.    Psychosocial and environmental problems addressed: Discussed with Mr. Doan how he could use his What Skills to find his dialectics: and how to find his Wise Mind. Discussed how over the next several sessions he will review in these session the skills he is using and the skills he is learning in DBT. Reminded him how he has been in a state of anxiety and defensiveness for a very long time and it will take this kind of practice to stay ventral and using his DBT skills to re-pattern and act from a state of safety and connection. The compromised nervous system is locked in early adaptive survival responses shaped by experiences that may tell a story of survival that persists automatically. Discussed how the re-patterning the autonomic nervous system is possible. Ongoing experiences continue to shape the autonomic nervous system. Our approach involves interrupting the traumatic pattern with experiences that exercise the neural circuits of connection. Developing a state of connection from a state of protection makes restoration possible. Discussed with Mr. Doan how he will learn strategies to increase ventral vagal moments at home. Encouraged him to be aware of his feelings of safety and connection here at [facility].    Current GAF: 55   Current medications: None.   Significant/ Recent Events: Discussed Dialectics and started learning the language of DBT. Discussed the concepts of acceptance and change and the need for balance. Discussed the States of Mind and how  DBT uses these states to develop perspective as precursors to change. Discussed how in DBT the work is to be in Wise Mind to demonstrate self-care, and mindfulness and the ability to act effectively.  In this session we reviewed the Distress Tolerance Module and the Skills Sets there and how these skills are used to create a space so that a person can have some time to find the dialectic, use the What Skills and find their Wise Mind and decide which How Skills make the most sense. Discussed the other Mindfulness Skills and the other Distress Tolerance Skills. Discussed what skills he could use the next time he experiences adversity at work or at home. Discussed how this psychotherapy would be informed by Polyvagal Theory, Positive Psychology (Mindful Self-Compassion), and he will develop Cognitive Behavioral Skills and Strategies to manage his anxieties and fears and find meaning and balance in his life. He asked to return to [facility] weekly do develop this therapy. Reminded him how this psychotherapy would focus on his wellbeing and progress at his pace and direction. He agreed to practice Mindfulness and Distress Tolerance Skills this week.    Informed consent issues discussed: Mr. Doan reported that he understood the process of this evaluation agreed to return to [facility]. Discussed how he expressed his desire to change and was willing to start the process. He reported that he was willing to make changes to his life toward a healthy lifestyle. He reported that he was aware of the problems he experienced and expressed the desire to solve those problems safely. He reported that he had a positive outlook for change. He reported that he was on his own right now but knew he needed to gain and maintain a healthy network of support.   Assignments/ Homework: Discussed with Mr. Doan how Ventral exercises help a person to be ventral and mindful. Discussed how a deep breath, a sigh, and a stretch are example of self-regulation.  Discussed how keeping a diary is a way of demonstrating accountability and it is also showing an awareness of the need to practice these skills every day.    Plan: Mr. Doan agreed to closely monitor his mood and behavior. He agreed to continue to monitor his diet and sleep and practice some of the skills discussed today.    Diagnoses: F41.1 Generalized Anxiety Disorder [] Provisional   Treatment Plan: [x] Continued [] New [] Replaced Referral:   Suicidal [] Yes [x] No [] Medical:    Violence: [] Yes [x] No [] Psychiatric:    Next session:     [] Neurological:            Justin Bruno Psy.D.  Clinical Psychologist  [facility]  NPI: [id number]

## 2022-03-30 ENCOUNTER — OFFICE VISIT (OUTPATIENT)
Dept: MEDICAL GROUP | Facility: MEDICAL CENTER | Age: 22
End: 2022-03-30
Payer: COMMERCIAL

## 2022-03-30 VITALS
WEIGHT: 212.52 LBS | OXYGEN SATURATION: 96 % | HEART RATE: 75 BPM | HEIGHT: 67 IN | BODY MASS INDEX: 33.36 KG/M2 | TEMPERATURE: 98.1 F | RESPIRATION RATE: 16 BRPM | DIASTOLIC BLOOD PRESSURE: 66 MMHG | SYSTOLIC BLOOD PRESSURE: 114 MMHG

## 2022-03-30 DIAGNOSIS — F33.9 EPISODE OF RECURRENT MAJOR DEPRESSIVE DISORDER, UNSPECIFIED DEPRESSION EPISODE SEVERITY (HCC): ICD-10-CM

## 2022-03-30 DIAGNOSIS — E66.9 OBESITY (BMI 30-39.9): ICD-10-CM

## 2022-03-30 DIAGNOSIS — Z13.79 GENETIC SCREENING: ICD-10-CM

## 2022-03-30 DIAGNOSIS — G47.33 OSA (OBSTRUCTIVE SLEEP APNEA): ICD-10-CM

## 2022-03-30 DIAGNOSIS — F40.10 SOCIAL ANXIETY DISORDER: ICD-10-CM

## 2022-03-30 DIAGNOSIS — Z00.00 PREVENTATIVE HEALTH CARE: ICD-10-CM

## 2022-03-30 PROCEDURE — 99213 OFFICE O/P EST LOW 20 MIN: CPT | Performed by: STUDENT IN AN ORGANIZED HEALTH CARE EDUCATION/TRAINING PROGRAM

## 2022-03-30 RX ORDER — FLUOXETINE HYDROCHLORIDE 40 MG/1
40 CAPSULE ORAL DAILY
Qty: 90 CAPSULE | Refills: 3 | Status: SHIPPED | OUTPATIENT
Start: 2022-03-30 | End: 2022-08-12 | Stop reason: SDUPTHER

## 2022-03-30 RX ORDER — FLUOXETINE HYDROCHLORIDE 20 MG/1
20 CAPSULE ORAL DAILY
Qty: 90 CAPSULE | Refills: 3 | Status: SHIPPED | OUTPATIENT
Start: 2022-03-30 | End: 2022-08-12 | Stop reason: SDUPTHER

## 2022-03-30 ASSESSMENT — PATIENT HEALTH QUESTIONNAIRE - PHQ9
5. POOR APPETITE OR OVEREATING: 2 - MORE THAN HALF THE DAYS
SUM OF ALL RESPONSES TO PHQ QUESTIONS 1-9: 12
CLINICAL INTERPRETATION OF PHQ2 SCORE: 2

## 2022-03-30 ASSESSMENT — ANXIETY QUESTIONNAIRES
GAD7 TOTAL SCORE: 10
7. FEELING AFRAID AS IF SOMETHING AWFUL MIGHT HAPPEN: SEVERAL DAYS
2. NOT BEING ABLE TO STOP OR CONTROL WORRYING: MORE THAN HALF THE DAYS
3. WORRYING TOO MUCH ABOUT DIFFERENT THINGS: MORE THAN HALF THE DAYS
5. BEING SO RESTLESS THAT IT IS HARD TO SIT STILL: MORE THAN HALF THE DAYS
4. TROUBLE RELAXING: SEVERAL DAYS
6. BECOMING EASILY ANNOYED OR IRRITABLE: SEVERAL DAYS
IF YOU CHECKED OFF ANY PROBLEMS ON THIS QUESTIONNAIRE, HOW DIFFICULT HAVE THESE PROBLEMS MADE IT FOR YOU TO DO YOUR WORK, TAKE CARE OF THINGS AT HOME, OR GET ALONG WITH OTHER PEOPLE: SOMEWHAT DIFFICULT
1. FEELING NERVOUS, ANXIOUS, OR ON EDGE: SEVERAL DAYS

## 2022-03-30 ASSESSMENT — FIBROSIS 4 INDEX: FIB4 SCORE: 0.37

## 2022-03-30 NOTE — PROGRESS NOTES
Subjective:     CC: depression follow up    HPI:   Syd presents today for depression follow up    Problem   Preventative Health Care    Vaccines: flu received 09/2021, Moderna #2 received 07/2021, Tdap received 02/2020, Trumenba completed 07/2018, Gardasil 9 completed 02/2016, Moderna COVID #2 received 07/2021 (plans to get booster)    Discussed and offered the no cost genetic screening through [facility] [facility]. Patient is interested in participating.       Depression    Chronic condition since high school. Since last visit, patient has been taking fluoxetine 20mg daily. He reports a 50% in his symptoms so far. He has noticed a favorable decreased appetite with 7lb weight loss since last visit. He is having a counseling therapy first appointment next week. He has been doing psychotherapy weekly.    Initial history:  He feels sad sometimes, low energy, low energy, brain fog. He has tried counseling therapy last year which helped but there was an issue with insurance so that stopped. He has never tried medication in the past. There are home stressors and work stressors. Denies SI/HI. He sleeps about 5-6 hours a night. He does feel sleepy in the afternoon. He tends to eat a lot at once for dinner. Energy level is usually low. Hanging out with his friend and family makes him happy.     Social Anxiety Disorder    Chronic condition. He feels he tends to keep to himself most of the time. He has been taking fluoxetine 20mg daily since last visit and has noticed 50% improvement so far.     Hemant (Obstructive Sleep Apnea)    Chronic condition. Patient states he sleeps about 5-6 hours each night. He does snore at night. He does feel sleepy in the afternoon everyday. His father has HEMANT. Waiting for CPAP device at this time.    He has sleep study consult scheduled for next week.         Current Outpatient Medications Ordered in Epic   Medication Sig Dispense Refill   • fluoxetine (PROZAC) 40 MG capsule Take 1 Capsule by  "mouth every day. 90 Capsule 3   • FLUoxetine (PROZAC) 20 MG Cap Take 1 Capsule by mouth every day. 90 Capsule 3     No current [facility]-ordered facility-administered medications on file.     SH: lives with parents at home, single, he drinks 10 shots at parties which is occasional, he is going to [employer] for associate degree, never smoker, social EtOH, denies illicit drugs     Health Maintenance: reviewed and discussed with patient  Vaccines: flu received 09/2021, Moderna #2 received 07/2021, Tdap received 02/2020, Trumenba completed 07/2018, Gardasil 9 completed 02/2016, Moderna COVID #2 received 07/2021 (plans to get booster)     ROS:  Gen: no fevers/chills, + weight loss  Pulm: no sob, no cough  CV: no chest pain, no palpitations  GI: no nausea/vomiting, no diarrhea  Neuro: no headaches  Psych: + chronic depression, anxiety    Objective:     Exam:  /66 (BP Location: Left arm, Patient Position: Sitting, BP Cuff Size: Adult)   Pulse 75   Temp 36.7 °C (98.1 °F) (Temporal)   Resp 16   Ht 1.702 m (5' 7\")   Wt 96.4 kg (212 lb 8.4 oz)   SpO2 96%   BMI 33.29 kg/m²  Body mass index is 33.29 kg/m².    General: Normal appearing. No distress.  Pulmonary: Clear to ausculation. Normal effort. No rales, ronchi, or wheezing.  Cardiovascular: Regular rate and rhythm without murmur.  Abdomen: Soft, nontender, nondistended. Normal bowel sounds.  Psych: Normal mood and affect. Alert and oriented x3. Judgment and insight is normal.    Labs:   Lab Results   Component Value Date/Time    TSHULTRASEN 0.430 03/25/2022 0928     Lab Results   Component Value Date/Time    SODIUM 139 09/20/2021 03:19 PM    POTASSIUM 3.8 09/20/2021 03:19 PM    CHLORIDE 101 09/20/2021 03:19 PM    CO2 23 09/20/2021 03:19 PM    ANION 15.0 09/20/2021 03:19 PM    GLUCOSE 71 09/20/2021 03:19 PM    BUN 11 09/20/2021 03:19 PM    CREATININE 0.85 09/20/2021 03:19 PM    CALCIUM 9.4 09/20/2021 03:19 PM    ASTSGOT 15 03/25/2022 09:28 AM    ALTSGPT 19 03/25/2022 09:28 " AM    TBILIRUBIN 0.6 03/25/2022 09:28 AM    ALBUMIN 4.3 03/25/2022 09:28 AM    TOTPROTEIN 7.3 03/25/2022 09:28 AM    GLOBULIN 3.0 09/20/2021 03:19 PM    AGRATIO 1.4 09/20/2021 03:19 PM         Assessment & Plan:     21 y.o. male with the following -     1. Episode of recurrent major depressive disorder, unspecified depression episode severity (HCC)  Chronic condition, improved.   - increased fluoxetine to 60mg qAM  - cont psychotherapy weekly  - fluoxetine (PROZAC) 40 MG capsule; Take 1 Capsule by mouth every day.  Dispense: 90 Capsule; Refill: 3  - FLUoxetine (PROZAC) 20 MG Cap; Take 1 Capsule by mouth every day.  Dispense: 90 Capsule; Refill: 3  - TSH; Future    2. Social anxiety disorder  - plan as above  - fluoxetine (PROZAC) 40 MG capsule; Take 1 Capsule by mouth every day.  Dispense: 90 Capsule; Refill: 3  - FLUoxetine (PROZAC) 20 MG Cap; Take 1 Capsule by mouth every day.  Dispense: 90 Capsule; Refill: 3  - TSH; Future    3. HEMANT (obstructive sleep apnea)  Chronic condition, stable.  - pending CPAP device    4. Preventative health care  - up to date on vaccinations  - return in 6 months for annual physical  - CBC WITH DIFFERENTIAL; Future  - VITAMIN D,25 HYDROXY; Future    5. Genetic screening  - Referral to Genetic Research Studies    6. Obesity (BMI 30-39.9)  - Comp Metabolic Panel; Future  - Lipid Profile; Future      Return in about 6 months (around 9/30/2022) for annual physical.    Please note that this dictation was created using voice recognition software. I have made every reasonable attempt to correct obvious errors, but I expect that there are errors of grammar and possibly content that I did not discover before finalizing the note.

## 2022-03-31 ENCOUNTER — APPOINTMENT (OUTPATIENT)
Dept: BEHAVIORAL HEALTH | Facility: CLINIC | Age: 22
End: 2022-03-31
Payer: COMMERCIAL

## 2022-04-08 ENCOUNTER — RESEARCH ENCOUNTER (OUTPATIENT)
Dept: MEDICAL GROUP | Facility: PHYSICIAN GROUP | Age: 22
End: 2022-04-08
Payer: COMMERCIAL

## 2022-04-08 DIAGNOSIS — Z00.6 RESEARCH STUDY PATIENT: ICD-10-CM

## 2022-04-15 ENCOUNTER — OFFICE VISIT (OUTPATIENT)
Dept: BEHAVIORAL HEALTH | Facility: CLINIC | Age: 22
End: 2022-04-15
Payer: COMMERCIAL

## 2022-04-15 DIAGNOSIS — F41.1 GENERALIZED ANXIETY DISORDER: ICD-10-CM

## 2022-04-15 PROCEDURE — 90837 PSYTX W PT 60 MINUTES: CPT | Performed by: PSYCHOLOGIST

## 2022-04-18 NOTE — PROGRESS NOTES
Name: Syd Doan Date: 4/15/22  99 Time in session 60 minutes   [] Initial Dx Eval [] Family [] Cancelled/Rescheduled [] Office visit   [x] Individual [] Group [] Late Cancellation [] Virtual Session   [] Couple [] Testing [] No call/No show [] Late   [] Discharge [] Phone [x] On time: 3:00 PM [x]  (8)   Attended: Mr. Doan (He wore a mask)   Observations/ Appearance/ Affect: Mr. Doan appeared clean, well groomed, and clean and dressed in his work scrubs. He was oriented to person, place, time and purpose, was pleasant and cooperative.  His affect was anxious and mood was euthymic. No suicidal or homicidal ideation described or reported. No reports of hallucinations or confusions. He participated well in this session.   Content/ Topics: Mr. Doan reported that he was doing well this week. He reported that he signed up for a [employer] Flight, to see if he would like flying. He reported that it was a kind of introductory flight and he loved it. He reported that he decided to take flying lessons. He reported that he sold his new car back to the [employer] and his mother gave him her old car to use. He reported that his parents were very supportive. He reported that his mother bought herself a car. He reported that there were some obstacles to overcome. He reported that he had a sleep apnea that had to be cleared up. He reported that he may have some color blindness that needed to be clarified and that he was taking an antidepressant and that he would have to have that medication also clarified. He reported that he was amazed that he could be so passionate about something he wanted to do. He reported that he was so pleased by his parent’s support. He reported that he could not see how work would help pay for his flying school. He reported that he was still going to [place] with family friends. He reported that he was going with the family of the girls that he knows and that one of them was once a possible  girlfriend. He reported that he was clear with them that he was going as a family friend. He reported that he was less irritable at home and at work. He reported that he cannot remember the last time he was irritable with his mother or at work.     Risk issues assessed: Discussed how DBT strategies and skills have helped him change his behavior. Discussed how the symptoms of anxiety and stress are often observed and interpreted as marked disruptions to the Social Engagement System. Discussed how these shifts in physiological and behavioral states, distorted social awareness and established habitual defensive reactions replaced appropriate spontaneous social behavior. Discussed with Mr. Doan how over the next several sessions he will learn how the body is wired for survival and connection. Discussed how the autonomic nervous system works as our personal surveillance system, always on guard, always alert to the question: “is it safe?” The work of the autonomic nervous system is to protect us by looking for cues safety and risk, sensing moment to moment of what is happening in and around our bodies and in the connections, we have to others. Discussed how over the next several sessions we will talk more about how this system works and can inform us about how the state of the nervous system sets the table for how we will respond, act and feel. He reported that he would not harm himself or another person and did not intend to harm himself or another. Discussed how the electronic record keeping platform here at [facility] can limit access to the psychotherapy notes and that this psychologist must geri the notes as sensitive. He gave consent and agreed to the extra firewalls and that if he had difficulty accessing his records, he could ask this psychologist to provide them.   Psychosocial and environmental problems addressed: Discussed with Mr. Doan how he used his What Skills to find his dialectics: and find his Wise Mind.  Reminded him how he has been in a state of anxiety and defensiveness for a very long time and it will take this kind of practice to stay ventral and using his DBT skills to re-pattern and act from a state of safety and connection. The compromised nervous system is locked in early adaptive survival responses shaped by experiences that may tell a story of survival that persists automatically. Discussed how the re-patterning the autonomic nervous system is possible. Ongoing experiences continue to shape the autonomic nervous system. Our approach involves interrupting the traumatic pattern with experiences that exercise the neural circuits of connection. Developing a state of connection from a state of protection makes restoration possible. Discussed with Mr. Doan how he is learning strategies to increase ventral vagal moments at home. Encouraged him to be aware of his feelings of safety and connection here at [facility].    Current GAF: 55   Current medications: None.   Significant/ Recent Events: Discussed Dialectics and using the language of DBT. Discussed the other Mindfulness Skills and the other Distress Tolerance Skills. Discussed using the DBT skills the next time he experiences adversity at work or at home. Discussed how this psychotherapy would be informed by Polyvagal Theory, Positive Psychology (Mindful Self-Compassion), and he will develop Cognitive Behavioral Skills and Strategies to manage his anxieties and fears and find meaning and balance in his life. He asked to return to [facility] weekly do develop this therapy. Reminded him how this psychotherapy would focus on his wellbeing and progress at his pace and direction. He agreed to practice Mindfulness and Distress Tolerance Skills this week.    Informed consent issues discussed: Mr. Doan expressed his desire to change and was willing to start the process. He reported that he was willing to make changes to his life toward a healthy lifestyle. He reported that he  was aware of the problems he experienced and expressed the desire to solve those problems safely. He reported that he had a positive outlook for change. He reported that he was on his own right now but knew he needed to gain and maintain a healthy network of support.   Assignments/ Homework: Discussed with Mr. Doan how Ventral exercises help a person to be ventral and mindful. Discussed how a deep breath, a sigh, and a stretch are example of self-regulation. Discussed how keeping a diary is a way of demonstrating accountability and it is also showing an awareness of the need to practice these skills every day.    Plan: Mr. Doan agreed to closely monitor his mood and behavior. He agreed to continue to monitor his diet and sleep and practice some of the skills discussed today.    Diagnoses: F41.1 Generalized Anxiety Disorder [] Provisional   Treatment Plan: [x] Continued [] New [] Replaced Referral:   Suicidal [] Yes [x] No [] Medical:    Violence: [] Yes [x] No [] Psychiatric:    Next session:     [] Neurological:            Justin Bruno Psy.D.  Clinical Psychologist  [facility]  NPI: [id number]

## 2022-04-20 PROBLEM — Z02.89 ENCOUNTER FOR COMPLETION OF FORM WITH PATIENT: Status: ACTIVE | Noted: 2022-04-20

## 2022-04-21 ENCOUNTER — OFFICE VISIT (OUTPATIENT)
Dept: MEDICAL GROUP | Facility: MEDICAL CENTER | Age: 22
End: 2022-04-21
Payer: COMMERCIAL

## 2022-04-21 VITALS
OXYGEN SATURATION: 97 % | DIASTOLIC BLOOD PRESSURE: 68 MMHG | TEMPERATURE: 97.9 F | BODY MASS INDEX: 33.81 KG/M2 | SYSTOLIC BLOOD PRESSURE: 114 MMHG | HEART RATE: 79 BPM | WEIGHT: 215.39 LBS | RESPIRATION RATE: 16 BRPM | HEIGHT: 67 IN

## 2022-04-21 DIAGNOSIS — F41.1 GENERALIZED ANXIETY DISORDER: ICD-10-CM

## 2022-04-21 DIAGNOSIS — Z02.89 ENCOUNTER FOR COMPLETION OF FORM WITH PATIENT: ICD-10-CM

## 2022-04-21 DIAGNOSIS — F33.1 MODERATE EPISODE OF RECURRENT MAJOR DEPRESSIVE DISORDER (HCC): ICD-10-CM

## 2022-04-21 DIAGNOSIS — F40.10 SOCIAL ANXIETY DISORDER: ICD-10-CM

## 2022-04-21 PROCEDURE — 99214 OFFICE O/P EST MOD 30 MIN: CPT | Performed by: STUDENT IN AN ORGANIZED HEALTH CARE EDUCATION/TRAINING PROGRAM

## 2022-04-21 ASSESSMENT — ANXIETY QUESTIONNAIRES
7. FEELING AFRAID AS IF SOMETHING AWFUL MIGHT HAPPEN: SEVERAL DAYS
3. WORRYING TOO MUCH ABOUT DIFFERENT THINGS: MORE THAN HALF THE DAYS
2. NOT BEING ABLE TO STOP OR CONTROL WORRYING: SEVERAL DAYS
6. BECOMING EASILY ANNOYED OR IRRITABLE: SEVERAL DAYS
4. TROUBLE RELAXING: SEVERAL DAYS
IF YOU CHECKED OFF ANY PROBLEMS ON THIS QUESTIONNAIRE, HOW DIFFICULT HAVE THESE PROBLEMS MADE IT FOR YOU TO DO YOUR WORK, TAKE CARE OF THINGS AT HOME, OR GET ALONG WITH OTHER PEOPLE: NOT DIFFICULT AT ALL
1. FEELING NERVOUS, ANXIOUS, OR ON EDGE: SEVERAL DAYS
GAD7 TOTAL SCORE: 8
5. BEING SO RESTLESS THAT IT IS HARD TO SIT STILL: SEVERAL DAYS

## 2022-04-21 ASSESSMENT — PATIENT HEALTH QUESTIONNAIRE - PHQ9
SUM OF ALL RESPONSES TO PHQ QUESTIONS 1-9: 10
5. POOR APPETITE OR OVEREATING: 2 - MORE THAN HALF THE DAYS
CLINICAL INTERPRETATION OF PHQ2 SCORE: 2

## 2022-04-21 ASSESSMENT — FIBROSIS 4 INDEX: FIB4 SCORE: 0.37

## 2022-04-21 NOTE — LETTER
April 21, 2022    To Whom It May Concern:         This is confirmation that Syd Ferris attended his scheduled appointment with Yassine Rico D.O. on 4/21/22. Please see below for an SSRI initial certification/clearance.    1. Qualifications: American Board of Family Medicine (NPI: [id number]), specialty: family medicine    2. History:   A. Initial symptoms include feeling sad, low energy, brain fog, overeating.   Treatment course:   12/2/2021: Initial evaluation for his depression symptoms. He was started on  fluoxetine 10mg daily for moderate major depressive disorder and referred to  behavioral health.   12/23/2021: increased fluoxetine to 20mg daily   1/28/2022: increased fluoxetine to 40mg daily   2/2/2022: first visit with psychiatrist and continued on fluoxetine 40mg daily   2/4/2022: first visit with psychologist for psychotherapy   3/30/2022: increased fluoxetine to 60mg daily   4/21/2022: continue with fluoxetine 60mg daily     B. Patient has moderate major depressive disorder and moderate generalized  anxiety disorder. This has been a chronic condition since childhood.    3. Medication   A. Current name and dose of medication: fluoxetine (Prozac) 60mg once every  morning   B. How long has patient been on this medication at this dosage? Since 3/30/2022   C. No side effects from current medication   D. Most recent change in medication was an increase in dosage on 3/30/2022  from fluoxetine 40mg to 60mg every morning   E. No previous medications.   F. No additional changes in dose or medication anticipated at this time.    4. Diagnosis   A. Current diagnoses: moderate recurrent major depressive disorder,  generalized anxiety disorder   B. His symptoms are moderate in severity but significantly improved and well  controlled with current medication        5. Summary, treatment and follow-up recommendations:   A. Overall psychiatric and behavioral status: well controlled moderate major  depressive  disorder and well controlled moderate generalized anxiety disorder  with good prognosis   B. Mr. Ferris will be followed by primary care physician every 6 months at this  time and he will continue management with his psychiatrist and psychologist per  their recommendations.   C. No clinical concerns and no change in current treatment plan at this time  unless otherwise recommended by his psychiatrist.    6. 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         If you have any questions please do not hesitate to call me at the phone number listed below.    Sincerely,          Yassine Rico D.O.  [phone]

## 2022-04-21 NOTE — PROGRESS NOTES
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     Moderate Episode of Recurrent Major Depressive Disorder (Hcc)    Chronic condition since high school. Since last visit, patient has been taking fluoxetine 60mg daily. He reports a 80% in his symptoms so far. He has noticed favorable decreased appetite with 7lb weight loss since previously. He is also followed by psychiatry Dr. Miguel. He has been doing psychotherapy weekly and reports helping a lot.     Treatment course:  12/2/2021: Initial evaluation for his depression symptoms. He was started on fluoxetine 10mg daily for moderate major depressive disorder and referred to behavioral health.  12/23/2021: increased fluoxetine to 20mg daily  1/28/2022: increased fluoxetine to 40mg daily  2/2/2022: first visit with psychiatrist and continued on fluoxetine 40mg daily  2/4/2022: first visit with psychologist for psychotherapy  3/30/2022: increased fluoxetine to 60mg daily    Initial history:  He feels sad sometimes, low energy, low energy, brain fog. He has tried counseling therapy last year which helped but there was an issue with insurance so that stopped. He has never tried medication in the past. There are home stressors and work stressors. Denies SI/HI. He sleeps about 5-6 hours a night. He does feel sleepy in the afternoon. He tends to eat a lot at once for dinner. Energy level is usually low. Hanging out with his friend and family makes him happy.     Generalized Anxiety Disorder    Chronic condition.  Current regimen: fluoxetine 60mg qAM  He reports compliance and/or tolerance and symptoms controlled with current medication(s). Does not need medication(s) refill. No acute concerns.       Depression    Chronic condition  since high school. Since last visit, patient has been taking fluoxetine 60mg daily. He reports a 80% in his symptoms so far. He has noticed a favorable decreased appetite with 7lb weight loss since last visit. He has been doing psychotherapy weekly and reports helping a lot.     Initial history:  He feels sad sometimes, low energy, low energy, brain fog. He has tried counseling therapy last year which helped but there was an issue with insurance so that stopped. He has never tried medication in the past. There are home stressors and work stressors. Denies SI/HI. He sleeps about 5-6 hours a night. He does feel sleepy in the afternoon. He tends to eat a lot at once for dinner. Energy level is usually low. Hanging out with his friend and family makes him happy.     Social Anxiety Disorder    Chronic condition. He feels he tends to keep to himself most of the time. He has been taking fluoxetine 60mg daily since last visit and has noticed 80% improvement so far.         Current Outpatient Medications Ordered in Epic   Medication Sig Dispense Refill   • fluoxetine (PROZAC) 40 MG capsule Take 1 Capsule by mouth every day. 90 Capsule 3   • FLUoxetine (PROZAC) 20 MG Cap Take 1 Capsule by mouth every day. 90 Capsule 3     No current [facility]-ordered facility-administered medications on file.     SH: lives with parents at home, single, he drinks 10 shots at parties which is occasional, he is going to [employer] for associate degree, never smoker, social EtOH, denies illicit drugs     Health Maintenance: reviewed and discussed with patient  Vaccines: flu received 09/2021, Moderna #2 received 07/2021, Tdap received 02/2020, Trumenba completed 07/2018, Gardasil 9 completed 02/2016, Moderna COVID #2 received 07/2021 (plans to get booster)     ROS:  Gen: no fevers/chills  Pulm: no sob, no cough  CV: no chest pain, no palpitations  GI: no nausea/vomiting, no diarrhea  Neuro: no headaches  Psych: + chronic depression, anxiety    Objective:  "    Exam:  /68 (BP Location: Left arm, Patient Position: Sitting, BP Cuff Size: Adult)   Pulse 79   Temp 36.6 °C (97.9 °F) (Temporal)   Resp 16   Ht 1.702 m (5' 7\")   Wt 97.7 kg (215 lb 6.2 oz)   SpO2 97%   BMI 33.73 kg/m²  Body mass index is 33.73 kg/m².    General: Normal appearing. No distress.  Pulmonary: Clear to ausculation. Normal effort. No rales, ronchi, or wheezing.  Cardiovascular: Regular rate and rhythm without murmur.  Abdomen: Soft, nontender, nondistended. Normal bowel sounds.  Psych: Normal mood and affect. Alert and oriented x3. Judgment and insight is normal.    Labs: no new lab results since last visit    PHQ9 score 10  GAD7 score 8    Assessment & Plan:     21 y.o. male with the following -     1. Moderate episode of recurrent major depressive disorder (HCC)  Chronic condition, improved. Followed by psychiatry and psychology. Doing well with current regimen.  - continue fluoxetine 60mg qAM  - continue follow up with psychiatrist and psychologist  - follow up in about 5 months  - Patient has been identified as having a positive depression screening. Appropriate orders and counseling have been given.    2. Generalized anxiety disorder  Chronic condition, improved. Followed by psychiatry and psychology. Doing well with current regimen.  - continue fluoxetine 60mg qAM  - continue follow up with psychiatrist and psychologist  - follow up in about 5 months    3. Social anxiety disorder  Chronic condition, improved. Followed by psychiatry and psychology.  - continue fluoxetine 60mg qAM  - continue follow up with psychiatrist and psychologist  - follow up in about 5 months    4. Encounter for completion of form with patient  - SSRI initial certification/clearance form completed and copy was provided to patient via [employer]    Return in about 5 months (around 9/21/2022) for f/u depression medication.    Please note that this dictation was created using voice recognition software. I have made " every reasonable attempt to correct obvious errors, but I expect that there are errors of grammar and possibly content that I did not discover before finalizing the note.

## 2022-04-22 ENCOUNTER — APPOINTMENT (OUTPATIENT)
Dept: BEHAVIORAL HEALTH | Facility: CLINIC | Age: 22
End: 2022-04-22
Payer: COMMERCIAL

## 2022-05-06 ENCOUNTER — OFFICE VISIT (OUTPATIENT)
Dept: BEHAVIORAL HEALTH | Facility: CLINIC | Age: 22
End: 2022-05-06
Payer: COMMERCIAL

## 2022-05-06 DIAGNOSIS — F41.1 GENERALIZED ANXIETY DISORDER: ICD-10-CM

## 2022-05-06 PROCEDURE — 90837 PSYTX W PT 60 MINUTES: CPT | Performed by: PSYCHOLOGIST

## 2022-05-10 NOTE — PROGRESS NOTES
Name: Syd Doan Date: 22  99 Time in session 60 minutes   [] Initial Dx Eval [] Family [] Cancelled/Rescheduled [] Office visit   [x] Individual [] Group [] Late Cancellation [] Virtual Session   [] Couple [] Testing [] No call/No show [] Late   [] Discharge [] Phone [x] On time: 3:00 PM [x]  (9)   Attended: Mr. Doan (He wore a mask)   Observations/ Appearance/ Affect: Mr. Doan appeared clean, well groomed, and clean and dressed in his work scrubs. He was oriented to person, place, time and purpose, was pleasant and cooperative.  His affect was anxious and mood was euthymic. No suicidal or homicidal ideation described or reported. No reports of hallucinations or confusions. He participated well in this session.   Content/ Topics: Mr. Doan reported that he was doing well this week. He reported that he enjoyed his trip to [place]. He reported that they have fun, enjoyed the weather and the food.  He reported that everyone got along well. He reported that some of them were making plans to return to [place] in the fall. He reported that he and his parents were getting along well. He reported that he was getting back into the work routine. He reported that he was looking forward to starting his flying lessons. He reported that all he needed was approval of his application and a clearance from the flight surgeon.    Risk issues assessed: Discussed how it was important to continue to use his DBT strategies and skills to change his behavior. Discussed how the symptoms of anxiety and stress are often observed and interpreted as marked disruptions to the Social Engagement System. Discussed how these shifts in physiological and behavioral states, distorted social awareness and established habitual defensive reactions replaced appropriate spontaneous social behavior. Discussed with Mr. Doan how the body is wired for survival and connection. Discussed how the autonomic nervous system works as our personal  surveillance system, always on guard, always alert to the question: “is it safe?” The work of the autonomic nervous system is to protect us by looking for cues safety and risk, sensing moment to moment of what is happening in and around our bodies and in the connections, we have to others. Discussed how this system works and can inform us about how the state of the nervous system sets the table for how we will respond, act and feel. He reported that he would not harm himself or another person and did not intend to harm himself or another.   Psychosocial and environmental problems addressed: Discussed with Mr. Doan how he desired to continue to develop his DBT skills. Reviewed how the What Skills are used to find his dialectics and find his Wise Mind. Reminded him how he has been in a state of anxiety and defensiveness for a very long time and it will take this kind of practice to stay ventral and using his DBT skills to re-pattern and act from a state of safety and connection. The compromised nervous system is locked in early adaptive survival responses shaped by experiences that may tell a story of survival that persists automatically. Discussed how the re-patterning the autonomic nervous system is possible. Ongoing experiences continue to shape the autonomic nervous system. Our approach involves interrupting the traumatic pattern with experiences that exercise the neural circuits of connection. Developing a state of connection from a state of protection makes restoration possible. Discussed with Mr. Doan how he is learning strategies to increase ventral vagal moments at home. Encouraged him to be aware of his feelings of safety and connection here at [facility].    Current GAF: 55   Current medications: None.   Significant/ Recent Events: Discussed using the language of DBT. Discussed reviewing the Mindfulness Skills and the other Distress Tolerance Skills. Discussed using the DBT skills the next time he experiences  adversity at work or at home. Discussed reviewing the Emotional Regulation Module at the next session. Discussed how this psychotherapy would be informed by Polyvagal Theory, Positive Psychology (Mindful Self-Compassion), and DBT and he will develop Cognitive Behavioral Skills and Strategies to manage his anxieties and fears and find meaning and balance in his life. Reminded him how this psychotherapy would focus on his wellbeing and progress at his pace and direction. He agreed to practice Mindfulness and Distress Tolerance Skills this week.    Informed consent issues discussed: Mr. Doan expressed his desire to change and was willing to start the process. He reported that he was willing to make changes to his life toward a healthy lifestyle. He reported that he was aware of the problems he experienced and expressed the desire to solve those problems safely. He reported that he had a positive outlook for change. He reported that he was on his own right now but knew he needed to gain and maintain a healthy network of support.   Assignments/ Homework: Discussed with Mr. Doan how Ventral exercises help a person to be ventral and mindful. Discussed how a deep breath, a sigh, and a stretch are example of self-regulation. Discussed how keeping a diary is a way of demonstrating accountability and it is also showing an awareness of the need to practice DBT skills every day.    Plan: Mr. Doan agreed to closely monitor his mood and behavior. He agreed to continue to monitor his diet and sleep and practice some of the skills discussed today.    Diagnoses: F41.1 Generalized Anxiety Disorder [] Provisional   Treatment Plan: [x] Continued [] New [] Replaced Referral:   Suicidal [] Yes [x] No [] Medical:    Violence: [] Yes [x] No [] Psychiatric:    Next session:     [] Neurological:            Justin Bruno Psy.D.  Clinical Psychologist  [facility]  NPI: [id number]

## 2022-05-17 LAB
APOB+LDLR+PCSK9 GENE MUT ANL BLD/T: NOT DETECTED
BRCA1+BRCA2 DEL+DUP + FULL MUT ANL BLD/T: NOT DETECTED
MLH1+MSH2+MSH6+PMS2 GN DEL+DUP+FUL M: NOT DETECTED

## 2022-05-18 ENCOUNTER — HOSPITAL ENCOUNTER (OUTPATIENT)
Dept: LAB | Facility: MEDICAL CENTER | Age: 22
End: 2022-05-18
Attending: EMERGENCY MEDICINE
Payer: COMMERCIAL

## 2022-05-18 PROCEDURE — 80307 DRUG TEST PRSMV CHEM ANLYZR: CPT

## 2022-05-20 ENCOUNTER — OFFICE VISIT (OUTPATIENT)
Dept: BEHAVIORAL HEALTH | Facility: CLINIC | Age: 22
End: 2022-05-20
Payer: COMMERCIAL

## 2022-05-20 DIAGNOSIS — F41.1 GENERALIZED ANXIETY DISORDER: ICD-10-CM

## 2022-05-20 LAB
AMPHETAMINES UR QL: NEGATIVE NG/ML
BARBITURATES UR QL: NEGATIVE NG/ML
BENZODIAZ UR QL: NEGATIVE NG/ML
CANNABINOIDS UR QL SCN: NEGATIVE NG/ML
COCAINE UR QL: NEGATIVE NG/ML
DRUG SCREEN COMMENT UR-IMP: NORMAL
MDMA CTO UR SCN-MCNC: NEGATIVE NG/ML
METHADONE UR QL: NEGATIVE NG/ML
OPIATES UR QL: NEGATIVE NG/ML
OXYCODONE CTO UR SCN-MCNC: NEGATIVE NG/ML
PCP UR QL SCN: NEGATIVE NG/ML
PROPOXYPH UR QL: NEGATIVE NG/ML

## 2022-05-20 PROCEDURE — 99999 PR NO CHARGE: CPT | Performed by: PSYCHOLOGIST

## 2022-05-23 NOTE — PROGRESS NOTES
Name:  Date:    15 30 45 75   [] Initial Dx Eval [] Family [] Cancelled/Rescheduled [] Office visit   [] Individual [] Group [] Late Cancellation [] Virtual Session   [] Couple [] Testing [] No call/No show [] Late   [] Discharge [] Phone [] On time:  []  (2)   Attended: This person (He wore a mask) (they were wearing masks)  Spoke with: Mrs. Kenyon  ([phone])  (virtual session via Microsoft Teams Platform) This person confirmed that he was in a quiet secure location, together, and that he was at home. This person reported that he was aware that this was a tele-psychotherapy service from [facility].   Observations/ Appearance/ Affect: This person appeared clean, well groomed, and clean and dressed in casual clothes. He was oriented X 3 pleasant and cooperative.  His affect was anxious and mood was sad. No suicidal or homicidal ideation described or reported. No reports of hallucinations or confusions.   Content/ Topics: This person reported that he was frustrated by his difficulty concentrating and inability to focus his attention, especially with schoolwork.   Added protocol if this person is suicidal:  Review confidentiality, privilege, and privacy (and the exceptions)  Inquire about past treatment; obtain records if possible.  Consult with and involve (when appropriate) family members, significant others, and relevant others to gain a balanced perspective.  Engage in careful consultation and collaborative decision making.  Refer as appropriate (e.g., medication evaluation).  Coordinate care with others on the treatment team.  Take action (e.g., regarding voluntary or involuntary hospitalization).  Use careful clinical judgment.  Document: (a) situations that raised alarms (client's behavior or words), (b) options you  considered, (c) options you ruled out, (d) action taken, (e) results of action, and (f) follow-up (what happened).  Follow [facility] institutional policy (?)   Risk issues assessed: This person reported  that he was often anxious and inattentive. Discussed with this person his current concerns regarding the Coronavirus and his concerns about contagion. Discussed how any information about this virus may be found on the Center for Disease Control website or the [facility] Coronavirus Resource Center ([email or web address].[facility]/) for accurate information about it. Discussed how recommended precautions seemed prudent: wash hand frequently, avoid crowds, wear masks when out and about, maintain social distancing ( ? 2 meters) cover mouth when coughing, and stay home if sick until better. Discussed how [facility] is providing Telehealth psychotherapy services using the Microsoft Teams Meetings Platform in a safe, secure and high-quality format. Discussed using the Microsoft Teams Meeting Platform if needed. SBQ-R = 3, Number of Risk Factors = 1. PHQ-9 = 17. He reported that he would not harm himself and did not intend to harm himself or another.   Psychosocial and environmental problems addressed: Discussed the problems at home and at school.   Current GAF: 50   Current medications: None.   Significant/ Recent Events: This person reported that he was frustrated with his problems with his schoolwork.    Informed consent issues discussed: This person reported that he understood the process of this evaluation agreed to return to [facility] to continue with testing.    Assignments/ Homework: This person agreed to return to [facility] to continue this evaluation.     Plan: This person agreed to closely monitor his mood and behavior.    Diagnoses:  [] Provisional   Treatment Plan: [] Continued [x] New [] Replaced Referral:   Suicidal [] Yes [x] No [] Medical:    Violence: [] Yes [x] No [] Psychiatric:    Next session:     [] Neurological:            Justin Bruno Psy.D.  Clinical Psychologist  [facility]  NPI: [id number]
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
## 2022-06-03 ENCOUNTER — OFFICE VISIT (OUTPATIENT)
Dept: BEHAVIORAL HEALTH | Facility: CLINIC | Age: 22
End: 2022-06-03
Payer: COMMERCIAL

## 2022-06-03 DIAGNOSIS — F40.10 SOCIAL ANXIETY DISORDER: ICD-10-CM

## 2022-06-03 DIAGNOSIS — F33.1 MODERATE EPISODE OF RECURRENT MAJOR DEPRESSIVE DISORDER (HCC): ICD-10-CM

## 2022-06-03 DIAGNOSIS — F41.1 GENERALIZED ANXIETY DISORDER: ICD-10-CM

## 2022-06-03 PROCEDURE — 90833 PSYTX W PT W E/M 30 MIN: CPT | Performed by: PSYCHIATRY & NEUROLOGY

## 2022-06-03 PROCEDURE — 99213 OFFICE O/P EST LOW 20 MIN: CPT | Performed by: PSYCHIATRY & NEUROLOGY

## 2022-06-17 ENCOUNTER — APPOINTMENT (OUTPATIENT)
Dept: BEHAVIORAL HEALTH | Facility: CLINIC | Age: 22
End: 2022-06-17
Payer: COMMERCIAL

## 2022-06-30 ENCOUNTER — APPOINTMENT (OUTPATIENT)
Dept: BEHAVIORAL HEALTH | Facility: CLINIC | Age: 22
End: 2022-06-30
Payer: COMMERCIAL

## 2022-07-13 NOTE — PROGRESS NOTES
CC: First compliance after the initiation of auto titrating CPAP 5 to 15 cm water.        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 who elicited a history of snoring, insufficient sleep, and excessive daytime somnolence and seen as a new patient on 3/2/2022.  A 3/18/2022 PSG showed mild HEMANT aggravated by REM sleep and the supine position.  The AHI was 13.3, the REM AHI was 33.6, and the supine AHI was 21.5.  Mild nocturnal desaturation was noted with a luca saturation of 82% and saturations less than or equal to 88% for 1.0% of the TST.    Auto titrating CPAP 5 to 15 cm water was ordered and the patient returns today for a compliance and clinical review.    However, he hasn't been using his unit as he thought that it was just a suggestion.    We discussed other treatments including application of a dental device.    Significant comorbidities and modifying factors include 12/21 COVID 19 infection, obesity, social anxiety disorder, elevated SGOT, depression, and need for SARS-CoV-2 booster.         Patient Active Problem List    Diagnosis Date Noted   • Encounter for completion of form with patient 04/20/2022   • Preventative health care 03/30/2022   • Anxiety 02/10/2022   • Moderate episode of recurrent major depressive disorder (HCC) 02/02/2022   • Generalized anxiety disorder 02/02/2022   • Depression 12/02/2021   • Elevated AST (SGOT) 12/02/2021   • Social anxiety disorder 12/02/2021   • HEMANT (obstructive sleep apnea) 12/02/2021   • Obesity (BMI 30-39.9) 09/20/2021       Past Medical History:   Diagnosis Date   • Anxiety    • Daytime sleepiness    • Depression    • Eating disorder    • Snoring         History reviewed. No pertinent surgical history.    Family History   Problem Relation Age of Onset   • Obesity Mother    • Schizophrenia Sister        Social History     Socioeconomic History   • Marital status: Single     Spouse name: Not on file   • Number of children: Not  "on file   • Years of education: Not on file   • Highest education level: Not on file   Occupational History   • Not on file   Tobacco Use   • Smoking status: Never Smoker   • Smokeless tobacco: Never Used   Vaping Use   • Vaping Use: Never used   Substance and Sexual Activity   • Alcohol use: Yes     Alcohol/week: 0.0 oz     Comment: Occasional   • Drug use: Never   • Sexual activity: Not on file   Other Topics Concern   • Not on file   Social History Narrative   • Not on file     Social Determinants of Health     Financial Resource Strain: Not on file   Food Insecurity: Not on file   Transportation Needs: Not on file   Physical Activity: Not on file   Stress: Not on file   Social Connections: Not on file   Intimate Partner Violence: Not on file   Housing Stability: Not on file       Current Outpatient Medications   Medication Sig Dispense Refill   • fluoxetine (PROZAC) 40 MG capsule Take 1 Capsule by mouth every day. 90 Capsule 3   • FLUoxetine (PROZAC) 20 MG Cap Take 1 Capsule by mouth every day. 90 Capsule 3     No current facility-administered medications for this visit.    \"CURRENT RX\"    ALLERGIES: Patient has no known allergies.    ROS    Constitutional: Denies fever, chills, sweats,  weight loss, fatigue  Cardiovascular: Denies chest pain, tightness, palpitations, swelling in legs/feet, fainting, difficulty breathing when lying down but gets better when sitting up.   Respiratory: Denies shortness of breath, cough, sputum, wheezing, painful breathing, coughing up blood.   Sleep: per HPI  Gastrointestinal: Denies  difficulty swallowing, nausea, abdominal pain, diarrhea, constipation, heartburn.  Musculoskeletal: Denies painful joints, sore muscles, back pain.        PHYSICAL EXAM      /88 (BP Location: Left arm, Patient Position: Sitting, BP Cuff Size: Large adult)   Pulse 76   Resp 16   Ht 1.702 m (5' 7\")   Wt 103 kg (227 lb)   SpO2 96%   BMI 35.55 kg/m²   Appearance: Well-nourished, " well-developed, no acute distress  Eyes:  PERRLA, EOMI  ENMT: masked  Neck: Supple, trachea midline, no masses  Respiratory effort:  No intercostal retractions or use of accessory muscles  Lung auscultation:  No wheezes rhonchi rubs or rales  Cardiac: No murmurs, rubs, or gallops; regular rhythm, normal rate; no edema  Abdomen:  No tenderness, no organomegaly.  Musculoskeletal:  Grossly normal; gait and station normal; digits and nails normal  Skin:  No rashes, petechiae, cyanosis  Neurologic: without focal signs; oriented to person, time, place, and purpose; judgement intact  Psychiatric:  No depression, anxiety, agitation      Medical Decision Making       The medical record was reviewed in its entirety including the referral notes, records from primary care, consultants notes, hospital records, lab, imaging, microbiology, immunology, and immunizations.  Care gaps identified and reviewed with the patient.    Diagnostic and titration nocturnal polysomnogram's, home sleep apnea tests, continuous nocturnal oximetry results, multiple sleep latency tests, and compliance reports reviewed.  1. HEMANT (obstructive sleep apnea)      PLAN:   Has been advised to use his CPAP unit.  He has never used his adult because he was feeling less tired.  He has had no motor vehicle accidents and is no longer falling asleep at work.  He is trying to become a  and will have to be compliant with CPAP for that to come to fruition.    He was advised start using his unit tonight and return in a month so we can provide him with compliance data.  Should he be CPAP intolerant we could refer him to dentistry for application of a mandibular repositioning device.      Has been advised to continue the current  APAP 5-15 cm water, clean equipment frequently, and get new mask and supplies as allowed by insurance and DME. Advised about potential problems including nasal obstruction/stuffiness, mask leak or discomfort , frequent awakenings, chill or  dryness of the upper airway, noise, inconvenience, and lack of benefit. Recommend an earlier appointment, if significant treatment barriers develop.    The risks of untreated sleep apnea were discussed with the patient at length. Patients with HEMANT are at increased risk of cardiovascular disease including coronary artery disease, systemic arterial hypertension, pulmonary arterial hypertension, cardiac arrythmias, and stroke. HEMANT patients have an increased risk of motor vehicle accidents, type 2 diabetes, chronic kidney disease, and non-alcoholic liver disease. The patient was advised to avoid driving a motor vehicle when drowsy.    Positive airway pressure will favorably impact many of the adverse conditions and effects provoked by HEMANT.    Have advised the patient to follow up with the appropriate healthcare practitioners for all other medical problems and issues.    N95 mask wearing, handwashing, and social distancing protocols reviewed and encouraged.    Return in about 1 month (around 8/18/2022).    Total time 30 minutes

## 2022-07-18 ENCOUNTER — OFFICE VISIT (OUTPATIENT)
Dept: SLEEP MEDICINE | Facility: MEDICAL CENTER | Age: 22
End: 2022-07-18
Payer: COMMERCIAL

## 2022-07-18 VITALS
OXYGEN SATURATION: 96 % | SYSTOLIC BLOOD PRESSURE: 122 MMHG | HEART RATE: 76 BPM | DIASTOLIC BLOOD PRESSURE: 88 MMHG | RESPIRATION RATE: 16 BRPM | HEIGHT: 67 IN | BODY MASS INDEX: 35.63 KG/M2 | WEIGHT: 227 LBS

## 2022-07-18 DIAGNOSIS — G47.33 OSA (OBSTRUCTIVE SLEEP APNEA): ICD-10-CM

## 2022-07-18 PROCEDURE — 99214 OFFICE O/P EST MOD 30 MIN: CPT | Performed by: INTERNAL MEDICINE

## 2022-07-18 ASSESSMENT — FIBROSIS 4 INDEX: FIB4 SCORE: 0.37

## 2022-08-11 NOTE — PROGRESS NOTES
"                                  [facility] PSYCHI[facility]RIC FOLLOW-UP NOTE    This provider informed the patient their medical records are totally confidential except for the use by other providers involved in their care, or if the patient signs a release, or to report instances of child or elder abuse, or if it is determined they are an immediate risk to harm themselves or others.  To avoid spread of covid-19 virus this follow up appointment was conducted face-to-face wearing masks and a face shield.     Time at beginning of session:  11:00 AM    TOTAL FACE-TO-FACE TIME  30 minutes    CHIEF COMPLAINT       Having depressed mood and generalized anxiety.  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 Judy Miguel for the treatment of recurrent Major Depression and Generalized Anxiety Disorder.and was referred to this provider for follow up.  He has had recurrent depressive episodes since high school and he attended [employer] for a few months after HS and dropped out due to poor motivation.  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  His episodes of Major Depressin have an typical pattern of anhedonia, hypersomnia (10 to 12 hours sleep/day)  and hyperphagia, markedly decreased energy, concentration, interest, and motivation, moderate psychomotor retardation, feeling hopeless and worthless, and having recurrent passive suicidal ideation \"that people would be better off without him.\"  He denies ever having an overt suicidal plan, intent, or attempt.       He also has had Generalized Anxiety Disorder with excessive worry and concern that he cannot control, feeling on edge and restless, decreased concentration and focus, trouble planning, irritability, difficulty relaxing, muscle tension in his neck, " shoulders, and upper back, and fidgeting or pacing.  His anxiety has been exacerbated by working with patients and concerns about becoming infected with the COVID-19  corona virus.  He actually tested positive for COVID-19 infection on 12/4/2021 and had brain fog.       He also was diagnosed with Social Anxiety Disorder and has fear of embarrassment or humiliation in most social situations.  He has fear of being observed while performing a task, difficulty meeting strangers, talking to authority figures, going to or giving a party, or going out on a date.       He worries a lot about his futureand last semester he restarted [employer] and passed 1 of 2 classes.  He is currently enrolled in 2 virtual classes.  He likes psychology.  He went back to the gym last week.       He describes his upbringing as very traditional, with his father shutting down any discussion of mental health. His parents are from [facility] and traditional islamic. When he was in elementary school, his sister (10 years older) began showing signs of mental illness and was later diagnosed  bipolar. She would frequently keep the patient housebound or grounded for perceived infractions. About 8 months ago, he called the police on his sister due to verbal aggression.        He is on fluoxetine 40mg po QAM and is in CBT therapy with Dr. Alhaji Roque.  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  He has increased energy by using a C-PAP machine for his HEMANT.  RESPONSE TO TREATMENT       Having alleviation of depression and anxiety  PAST PSYCHIATRIC MEDICATIONS       fluoxetine  MEDICATION SIDE EFFECTS       None    MEDICAL REVIEW OF SYSTEMS:   Constitutional positive - daytime fatigue   Eyes negative   Ears/Nose/Mouth/Throat negative   Cardiovascular negative   Respiratory positive - HEMANT   Gastrointestinal negative   Genitourinary negative   Muscular negative   Integumentary  negative   Neurological negative   Endocrine positive - obesity, BNI = 33.45   Hematologic/Lymphatic negative        MENTAL STATUS EVALUATION  There were no vitals taken for this visit.  Participation: Active verbal participation  Grooming:Neat  Orientation: Fully Oriented  Eye contact: Good  Behavior:Calm   Mood: Euthymic  Affect: Full range  Thought process: Logical  Thought content:  Within normal limits  Speech: Rate within normal limits  Perception:  Within normal limits  Memory:  No gross evidence of memory deficits  Insight: Good  Judgment: Good  Family/couple interaction observations:   Other:  CRYSTAL-7 Questionnaire    Feeling nervous, anxious, or on edge:  1  Not being able to stop or control worryin  Worrying too much about different things:  1  Trouble relaxin  Being so restless that it's hard to sit still:  0  Becoming easily annoyed or irritable:  1  Feeling afraid as if something awful might happen:  0  Total:  3    Interpretation of CRYSTAL 7 Total Score   Score Severity :  0-4 No Anxiety   5-9 Mild Anxiety  10-14 Moderate Anxiety  15-21 Severe Anxiety   Depression Screening    Little interest or pleasure in doing things?    1  Feeling down, depressed , or hopeless?   1  Trouble falling or staying asleep, or sleeping too much?    0  Feeling tired or having little energy?    0  Poor appetite or overeating?    1  Feeling bad about yourself - or that you are a failure or have let yourself or your family down?   1  Trouble concentrating on things, such as reading the newspaper or watching television?   1  Moving or speaking so slowly that other people could have noticed.  Or the opposite - being so fidgety or restless that you have been moving around a lot more than usual?    0  Thoughts that you would be better off dead, or of hurting yourself?    0  Patient Health Questionnaire Score:   5      If depressive symptoms identified deferred to follow up visit unless specifically addressed in assesment  and plan.    Interpretation of PHQ-9 Total Score   Score Severity   1-4 No Depression   5-9 Mild Depression   10-14 Moderate Depression   15-19 Moderately Severe Depression   20-27 Severe Depression   Current risk:    Suicide: Low   Homicide: Not applicable   Self-harm: Low  Relapse: Moderate  Other:   Crisis Safety Plan reviewed?No  If evidence of imminent risk is present, intervention/plan:    Medical Records/Labs/Diagnostic Tests Reviewed: yes    Medical Records/Labs/Diagnostic Tests Ordered: no    DIAGNOSTIC IMPRESSIONS       (1) Major Depression, Recurrent, Moderate (F33.1)       (2) Generalized Anxiety Disorder (F41.1)       (3) Social Anxiety disorder (F41.0)    ASSESSMENT AND PLAN       The patient has had partial alleviation of depression on fluoxetine 40mg po QAM and by going to outpatient therapy (CBT and DBT)with Dr. Alhaji Roque.       Continue fluoxetine 40mg po QAM.       Continue outpatient psychotherapy with Mya Gonzales.    Time at end of session:  11:30 AM    Greater than 16 minutes spent in psychotherapy exclusive of my E&M.  Topics addressed in psychotherapy include:  It will take time to overcome his social anxiety and he must keep testing the envelope.  His strict upbringing in an [place] family inhibits his development interpersonally.

## 2022-08-12 ENCOUNTER — PATIENT MESSAGE (OUTPATIENT)
Dept: BEHAVIORAL HEALTH | Facility: CLINIC | Age: 22
End: 2022-08-12
Payer: COMMERCIAL

## 2022-08-12 ENCOUNTER — OFFICE VISIT (OUTPATIENT)
Dept: BEHAVIORAL HEALTH | Facility: CLINIC | Age: 22
End: 2022-08-12
Payer: COMMERCIAL

## 2022-08-12 DIAGNOSIS — F40.10 SOCIAL ANXIETY DISORDER: ICD-10-CM

## 2022-08-12 DIAGNOSIS — F33.1 MODERATE EPISODE OF RECURRENT MAJOR DEPRESSIVE DISORDER (HCC): ICD-10-CM

## 2022-08-12 DIAGNOSIS — F41.1 GENERALIZED ANXIETY DISORDER: ICD-10-CM

## 2022-08-12 DIAGNOSIS — F33.9 EPISODE OF RECURRENT MAJOR DEPRESSIVE DISORDER, UNSPECIFIED DEPRESSION EPISODE SEVERITY (HCC): ICD-10-CM

## 2022-08-12 PROCEDURE — 99213 OFFICE O/P EST LOW 20 MIN: CPT | Performed by: PSYCHIATRY & NEUROLOGY

## 2022-08-12 PROCEDURE — 90833 PSYTX W PT W E/M 30 MIN: CPT | Performed by: PSYCHIATRY & NEUROLOGY

## 2022-08-12 RX ORDER — FLUOXETINE HYDROCHLORIDE 40 MG/1
40 CAPSULE ORAL DAILY
Qty: 90 CAPSULE | Refills: 1 | Status: SHIPPED | OUTPATIENT
Start: 2022-08-12 | End: 2022-11-10

## 2022-08-12 RX ORDER — FLUOXETINE HYDROCHLORIDE 20 MG/1
20 CAPSULE ORAL DAILY
Qty: 90 CAPSULE | Refills: 1 | Status: SHIPPED | OUTPATIENT
Start: 2022-08-12 | End: 2022-11-10

## 2022-08-18 ENCOUNTER — APPOINTMENT (OUTPATIENT)
Dept: BEHAVIORAL HEALTH | Facility: CLINIC | Age: 22
End: 2022-08-18
Payer: COMMERCIAL

## 2022-09-02 ENCOUNTER — OFFICE VISIT (OUTPATIENT)
Dept: SLEEP MEDICINE | Facility: MEDICAL CENTER | Age: 22
End: 2022-09-02
Payer: COMMERCIAL

## 2022-09-02 VITALS
SYSTOLIC BLOOD PRESSURE: 118 MMHG | RESPIRATION RATE: 16 BRPM | WEIGHT: 226.4 LBS | BODY MASS INDEX: 35.53 KG/M2 | OXYGEN SATURATION: 97 % | HEIGHT: 67 IN | DIASTOLIC BLOOD PRESSURE: 74 MMHG | HEART RATE: 97 BPM

## 2022-09-02 DIAGNOSIS — G47.33 OSA (OBSTRUCTIVE SLEEP APNEA): ICD-10-CM

## 2022-09-02 PROCEDURE — 99213 OFFICE O/P EST LOW 20 MIN: CPT | Performed by: NURSE PRACTITIONER

## 2022-09-02 ASSESSMENT — FIBROSIS 4 INDEX: FIB4 SCORE: 0.37

## 2022-09-02 NOTE — PROGRESS NOTES
Chief Complaint   Patient presents with    Apnea       HPI:  Syd Ferris is a 21 y.o. year old male here today for follow-up on HEMANT.  Last seen 7/18/2022 by Dr. Vazquez,  who elicited a history of snoring, insufficient sleep, and excessive daytime somnolence and seen as a new patient on 3/2/2022.  A 3/18/2022 PSG showed mild HEMANT aggravated by REM sleep and the supine position.  The AHI was 13.3, the REM AHI was 33.6, and the supine AHI was 21.5.  Mild nocturnal desaturation was noted with a luca saturation of 82% and saturations less than or equal to 88% for 1.0% of the TST.     Auto titrating CPAP 5 to 15 cm water was ordered and the patient returns today for a compliance and clinical review.  Patient was previously not using his CPAP.  Patient returns today for compliance recheck.  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    She is currently using a nasal mask and denies any difficulty with mask fit or pressures.  He sleeps between a 6 to 7 hours nightly and denies any difficulty falling or staying asleep.  With CPAP he notes improved sleep quality.  Previously he was experiencing fatigue while sitting in a chair or when driving.  He notes overall improvement in these complaints.  He denies any excessive daytime sleepiness, morning headaches, palpitations, concentration or memory problems.    32-day compliance was reviewed and does show 31 out of 32-day use with an average time of 7 hours and 22 minutes.  AHI is 1.2 with average leak of 3.5 L/min.    ROS: As per HPI and otherwise negative if not stated.    Past Medical History:   Diagnosis Date    Anxiety     Daytime sleepiness     Depression     Eating disorder     Snoring        History reviewed. No pertinent surgical history.    Family History   Problem Relation Age of Onset    Obesity Mother     Schizophrenia Sister        Allergies as of 09/02/2022    (No Known Allergies)        Vitals:  /74 (BP Location: Left  "arm, Patient Position: Sitting, BP Cuff Size: Adult)   Pulse 97   Resp 16   Ht 1.702 m (5' 7\")   Wt 103 kg (226 lb 6.4 oz)   SpO2 97%     Current medications as of today   Current Outpatient Medications   Medication Sig Dispense Refill    fluoxetine (PROZAC) 40 MG capsule Take 1 Capsule by mouth every day for 90 days. 90 Capsule 1    FLUoxetine (PROZAC) 20 MG Cap Take 1 Capsule by mouth every day for 90 days. 90 Capsule 1     No current facility-administered medications for this visit.         Physical Exam:   Gen:           Alert and oriented, No apparent distress. Mood and affect appropriate, normal interaction with examiner.  Eyes:          PERRL, EOM intact, sclere white, conjunctive moist.  Ears:          Not examined.   Hearing:     Grossly intact.  Nose:          Normal, no lesions or deformities.  Dentition:    Good dentition.  Oropharynx:   Tongue normal, posterior pharynx without erythema or exudate.  Neck:        Supple, trachea midline, no masses.  Respiratory Effort: No intercostal retractions or use of accessory muscles.   Lung Auscultation:      Clear to auscultation bilaterally; no rales, rhonchi or wheezing.  CV:            Regular rate and rhythm. No murmurs, rubs or gallops.  Abd:           Not examined.   Lymphadenopathy: Not examined.  Gait and Station: Normal.  Digits and Nails: No clubbing, cyanosis, petechiae, or nodes.   Cranial Nerves: II-XII grossly intact.  Skin:        No rashes, lesions or ulcers noted.               Ext:           No cyanosis or edema.      Assessment:  1. HEMANT (obstructive sleep apnea)            Plan:  Patient is using and benefiting from CPAP therapy.  Compliance shows adequate use and control of HEMANT.  Clearance letter given as per patient's request for flight school.  Advised patient to continue using CPAP nightly for optimal benefit.  New mask and supplies at least weekly.  Replace supplies as eligible through insurance.  Advised patient to follow-up in April " 2023 for annual HEMANT.  Can be seen sooner if needed.  Advised to follow-up all healthcare related concerns with appropriate healthcare providers.    Please note that this dictation was created using voice recognition software. I have made every reasonable attempt to correct obvious errors, but it is possible there are errors of grammar and possibly content that I did not discover before finalizing the note.

## 2022-09-29 ENCOUNTER — OFFICE VISIT (OUTPATIENT)
Dept: BEHAVIORAL HEALTH | Facility: CLINIC | Age: 22
End: 2022-09-29
Payer: COMMERCIAL

## 2022-09-29 ENCOUNTER — OFFICE VISIT (OUTPATIENT)
Dept: MEDICAL GROUP | Facility: MEDICAL CENTER | Age: 22
End: 2022-09-29
Payer: COMMERCIAL

## 2022-09-29 ENCOUNTER — HOSPITAL ENCOUNTER (OUTPATIENT)
Dept: LAB | Facility: MEDICAL CENTER | Age: 22
End: 2022-09-29
Attending: STUDENT IN AN ORGANIZED HEALTH CARE EDUCATION/TRAINING PROGRAM
Payer: COMMERCIAL

## 2022-09-29 VITALS
BODY MASS INDEX: 35.99 KG/M2 | RESPIRATION RATE: 16 BRPM | HEIGHT: 67 IN | DIASTOLIC BLOOD PRESSURE: 68 MMHG | TEMPERATURE: 98 F | SYSTOLIC BLOOD PRESSURE: 126 MMHG | WEIGHT: 229.28 LBS | HEART RATE: 77 BPM | OXYGEN SATURATION: 95 %

## 2022-09-29 DIAGNOSIS — F33.9 EPISODE OF RECURRENT MAJOR DEPRESSIVE DISORDER, UNSPECIFIED DEPRESSION EPISODE SEVERITY (HCC): ICD-10-CM

## 2022-09-29 DIAGNOSIS — E66.9 OBESITY (BMI 30-39.9): ICD-10-CM

## 2022-09-29 DIAGNOSIS — F40.10 SOCIAL ANXIETY DISORDER: ICD-10-CM

## 2022-09-29 DIAGNOSIS — Z00.00 PREVENTATIVE HEALTH CARE: ICD-10-CM

## 2022-09-29 DIAGNOSIS — F33.1 MODERATE EPISODE OF RECURRENT MAJOR DEPRESSIVE DISORDER (HCC): ICD-10-CM

## 2022-09-29 DIAGNOSIS — Z23 NEED FOR VACCINATION: ICD-10-CM

## 2022-09-29 DIAGNOSIS — F41.1 GENERALIZED ANXIETY DISORDER: ICD-10-CM

## 2022-09-29 DIAGNOSIS — G47.33 OSA ON CPAP: ICD-10-CM

## 2022-09-29 LAB
25(OH)D3 SERPL-MCNC: 11 NG/ML (ref 30–100)
ALBUMIN SERPL BCP-MCNC: 4.3 G/DL (ref 3.2–4.9)
ALBUMIN/GLOB SERPL: 1.3 G/DL
ALP SERPL-CCNC: 106 U/L (ref 30–99)
ALT SERPL-CCNC: 20 U/L (ref 2–50)
ANION GAP SERPL CALC-SCNC: 10 MMOL/L (ref 7–16)
AST SERPL-CCNC: 15 U/L (ref 12–45)
BASOPHILS # BLD AUTO: 0.5 % (ref 0–1.8)
BASOPHILS # BLD: 0.04 K/UL (ref 0–0.12)
BILIRUB SERPL-MCNC: 0.7 MG/DL (ref 0.1–1.5)
BUN SERPL-MCNC: 14 MG/DL (ref 8–22)
CALCIUM SERPL-MCNC: 9.5 MG/DL (ref 8.5–10.5)
CHLORIDE SERPL-SCNC: 102 MMOL/L (ref 96–112)
CHOLEST SERPL-MCNC: 198 MG/DL (ref 100–199)
CO2 SERPL-SCNC: 27 MMOL/L (ref 20–33)
CREAT SERPL-MCNC: 0.95 MG/DL (ref 0.5–1.4)
EOSINOPHIL # BLD AUTO: 0.2 K/UL (ref 0–0.51)
EOSINOPHIL NFR BLD: 2.3 % (ref 0–6.9)
ERYTHROCYTE [DISTWIDTH] IN BLOOD BY AUTOMATED COUNT: 38.5 FL (ref 35.9–50)
FASTING STATUS PATIENT QL REPORTED: NORMAL
GFR SERPLBLD CREATININE-BSD FMLA CKD-EPI: 116 ML/MIN/1.73 M 2
GLOBULIN SER CALC-MCNC: 3.4 G/DL (ref 1.9–3.5)
GLUCOSE SERPL-MCNC: 86 MG/DL (ref 65–99)
HCT VFR BLD AUTO: 46.6 % (ref 42–52)
HDLC SERPL-MCNC: 54 MG/DL
HGB BLD-MCNC: 16.3 G/DL (ref 14–18)
IMM GRANULOCYTES # BLD AUTO: 0.06 K/UL (ref 0–0.11)
IMM GRANULOCYTES NFR BLD AUTO: 0.7 % (ref 0–0.9)
LDLC SERPL CALC-MCNC: 122 MG/DL
LYMPHOCYTES # BLD AUTO: 2.58 K/UL (ref 1–4.8)
LYMPHOCYTES NFR BLD: 29.9 % (ref 22–41)
MCH RBC QN AUTO: 30.5 PG (ref 27–33)
MCHC RBC AUTO-ENTMCNC: 35 G/DL (ref 33.7–35.3)
MCV RBC AUTO: 87.1 FL (ref 81.4–97.8)
MONOCYTES # BLD AUTO: 0.66 K/UL (ref 0–0.85)
MONOCYTES NFR BLD AUTO: 7.6 % (ref 0–13.4)
NEUTROPHILS # BLD AUTO: 5.09 K/UL (ref 1.82–7.42)
NEUTROPHILS NFR BLD: 59 % (ref 44–72)
NRBC # BLD AUTO: 0 K/UL
NRBC BLD-RTO: 0 /100 WBC
PLATELET # BLD AUTO: 226 K/UL (ref 164–446)
PMV BLD AUTO: 10 FL (ref 9–12.9)
POTASSIUM SERPL-SCNC: 4 MMOL/L (ref 3.6–5.5)
PROT SERPL-MCNC: 7.7 G/DL (ref 6–8.2)
RBC # BLD AUTO: 5.35 M/UL (ref 4.7–6.1)
SODIUM SERPL-SCNC: 139 MMOL/L (ref 135–145)
TRIGL SERPL-MCNC: 111 MG/DL (ref 0–149)
TSH SERPL DL<=0.005 MIU/L-ACNC: 0.32 UIU/ML (ref 0.38–5.33)
WBC # BLD AUTO: 8.6 K/UL (ref 4.8–10.8)

## 2022-09-29 PROCEDURE — 82306 VITAMIN D 25 HYDROXY: CPT

## 2022-09-29 PROCEDURE — 90686 IIV4 VACC NO PRSV 0.5 ML IM: CPT | Performed by: STUDENT IN AN ORGANIZED HEALTH CARE EDUCATION/TRAINING PROGRAM

## 2022-09-29 PROCEDURE — 80061 LIPID PANEL: CPT

## 2022-09-29 PROCEDURE — 80053 COMPREHEN METABOLIC PANEL: CPT

## 2022-09-29 PROCEDURE — 84443 ASSAY THYROID STIM HORMONE: CPT

## 2022-09-29 PROCEDURE — 85025 COMPLETE CBC W/AUTO DIFF WBC: CPT

## 2022-09-29 PROCEDURE — 99213 OFFICE O/P EST LOW 20 MIN: CPT | Mod: 25 | Performed by: STUDENT IN AN ORGANIZED HEALTH CARE EDUCATION/TRAINING PROGRAM

## 2022-09-29 PROCEDURE — 36415 COLL VENOUS BLD VENIPUNCTURE: CPT

## 2022-09-29 PROCEDURE — 90837 PSYTX W PT 60 MINUTES: CPT | Performed by: PSYCHOLOGIST

## 2022-09-29 PROCEDURE — 90471 IMMUNIZATION ADMIN: CPT | Performed by: STUDENT IN AN ORGANIZED HEALTH CARE EDUCATION/TRAINING PROGRAM

## 2022-09-29 ASSESSMENT — FIBROSIS 4 INDEX: FIB4 SCORE: 0.37

## 2022-09-29 NOTE — PROGRESS NOTES
Subjective:     CC: follow up    HPI:   Syd presents today for follow up    Problem   Moderate Episode of Recurrent Major Depressive Disorder (Hcc)    Chronic condition since high school. He reports symptoms are well controlled with current regimen.    Current regimen: fluoxetine 60mg nightly    He is also followed by psychiatry Dr. Miguel. He has been doing psychotherapy weekly and reports helping a lot.     Treatment course:  12/2/2021: Initial evaluation for his depression symptoms. He was started on fluoxetine 10mg daily for moderate major depressive disorder and referred to behavioral health.  12/23/2021: increased fluoxetine to 20mg daily  1/28/2022: increased fluoxetine to 40mg daily  2/2/2022: first visit with psychiatrist and continued on fluoxetine 40mg daily  2/4/2022: first visit with psychologist for psychotherapy  3/30/2022: increased fluoxetine to 60mg daily    Initial history:  He feels sad sometimes, low energy, low energy, brain fog. He has tried counseling therapy last year which helped but there was an issue with insurance so that stopped. He has never tried medication in the past. There are home stressors and work stressors. Denies SI/HI. He sleeps about 5-6 hours a night. He does feel sleepy in the afternoon. He tends to eat a lot at once for dinner. Energy level is usually low. Hanging out with his friend and family makes him happy.     Generalized Anxiety Disorder    Chronic condition.  Current regimen: fluoxetine 60mg qhs  He reports compliance and/or tolerance and symptoms controlled with current medication(s). Does not need medication(s) refill. No acute concerns.       Social Anxiety Disorder    Chronic condition. He feels he tends to keep to himself most of the time.    Current regimen: fluoxetine 60mg nightly     Lenin On Cpap    Chronic condition. Followed by sleep medicine. He has been using CPAP for about 2 months now. Sleep quality has improved with CPAP.    Current regimen:  "CPAP    Patient states he sleeps about 5-6 hours each night. He does snore at night. He does feel sleepy in the afternoon everyday. His father has HEMANT. Waiting for CPAP device at this time.    He has sleep study consult scheduled for next week.     Obesity (Bmi 30-39.9)    Chronic condition. He would like to get recommendations on how to lose weight. He tends to eat fast food in general. He stays active with going to gym 3x/week for about an hour each time.           Current Outpatient Medications Ordered in Epic   Medication Sig Dispense Refill    fluoxetine (PROZAC) 40 MG capsule Take 1 Capsule by mouth every day for 90 days. 90 Capsule 1    FLUoxetine (PROZAC) 20 MG Cap Take 1 Capsule by mouth every day for 90 days. 90 Capsule 1     No current Epic-ordered facility-administered medications on file.     SH: lives with parents at home, single, he drinks 10 shots at parties which is occasional, he is going to [employer] for associate degree, never smoker, social EtOH, denies illicit drugs     Health Maintenance: reviewed and discussed with patient  Vaccines: flu received 09/2021, Moderna #2 received 07/2021, Tdap received 02/2020, Trumenba completed 07/2018, Gardasil 9 completed 02/2016, Moderna COVID #2 received 07/2021 (plans to get booster)     ROS:  Gen: no fevers/chills  Pulm: no sob, no cough  CV: no chest pain, no palpitations  GI: no nausea/vomiting, no diarrhea  Neuro: no headaches  Psych: + chronic depression, anxiety    Objective:     Exam:  /68 (BP Location: Left arm, Patient Position: Sitting, BP Cuff Size: Adult)   Pulse 77   Temp 36.7 °C (98 °F) (Temporal)   Resp 16   Ht 1.702 m (5' 7\")   Wt 104 kg (229 lb 4.5 oz)   SpO2 95%   BMI 35.91 kg/m²  Body mass index is 35.91 kg/m².    General: Normal appearing. No distress.  Pulmonary: Clear to ausculation. Normal effort. No rales, ronchi, or wheezing.  Cardiovascular: Regular rate and rhythm without murmur.  Abdomen: Soft, nontender, nondistended. " Normal bowel sounds.  Psych: Normal mood and affect. Alert and oriented x3. Judgment and insight is normal.     Labs: no new lab results since last visit, labs are pending    Assessment & Plan:     21 y.o. male with the following -     1. Moderate episode of recurrent major depressive disorder (HCC)  Chronic condition, stable.  - cont current regimen: fluoxetine 60mg qhs    2. Generalized anxiety disorder  Chronic condition, stable.  - cont current regimen: fluoxetine 60mg qhs    3. Social anxiety disorder  Chronic condition, stable.  - cont current regimen: fluoxetine 60mg qhs    4. Obesity (BMI 30-39.9)  Body mass index is 35.91 kg/m².  - discussed lifestyle modifications including weight loss (lose 5-10% of current body weight, no more than 2 pounds per week), healthy diet (eat regular meals with a variety of food, limit daily intake of saturated fat and sodium, limit high-sugar foods, avoid sodas and alcohol, and stay physically active (at least 30 minutes of moderate-intensity physical activity 3-5x/week)   - pt was offered referral to dietician/nutrition specialist as per USPSTF guideline, patient declined today    5. HEMANT on CPAP  Chronic condition, stable.  - cont current regimen: CPAP    6. Need for vaccination  - Influenza Vaccine Quad Injection (PF)    Return in about 6 months (around 3/29/2023) for chronic medical conditions.    Please note that this dictation was created using voice recognition software. I have made every reasonable attempt to correct obvious errors, but I expect that there are errors of grammar and possibly content that I did not discover before finalizing the note.

## 2022-09-30 NOTE — PROGRESS NOTES
Name: Syd Doan Date: 22  99 Time in session 60 minutes   [] Initial Dx Eval [] Family [] Cancelled/Rescheduled [] Office visit   [x] Individual [] Group [] Late Cancellation [] Virtual Session   [] Couple [] Testing [] No call/No show [] Late   [] Discharge [] Phone [x] On time: 2:00 PM [x]  (11)   Attended: Mr. Doan (He wore a mask)   Observations/ Appearance/ Affect: Mr. Doan appeared clean, well groomed, and clean and dressed in his work scrubs. He was oriented to person, place, time and purpose, was pleasant and cooperative.  His affect was anxious and mood was euthymic. No suicidal or homicidal ideation described or reported. No reports of hallucinations or confusions. He participated well in this session.   Content/ Topics: Mr. Doan reported that he was doing well. 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 He reported that he got it all done and passed everything. He reported that he was hopeful that in a few weeks he will be confirmed. He reported that he was looking forward to going to his flight school at [employer]. 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 He reported that work was going well. 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 He reported that it was closer to home and the hours were good. He reported that he struggled with his diet. He reported that he was getting along well with his parents and his friends.   Risk issues assessed: He reported that he had not looked at his modules all summer and wanted to start using the skills again. Discussed how the symptoms of anxiety and stress are often observed and interpreted as marked disruptions to the Social Engagement System.  Discussed how these shifts in physiological and behavioral states, distorted social awareness and established habitual defensive reactions replaced appropriate spontaneous social behavior. Discussed with Mr. Doan how the body is wired for survival and connection. Discussed how the autonomic nervous system works as our personal surveillance system, always on guard, always alert to the question: “is it safe?” The work of the autonomic nervous system is to protect us by looking for cues safety and risk, sensing moment to moment of what is happening in and around our bodies and in the connections, we have to others. Discussed how this system works and can inform us about how the state of the nervous system sets the table for how we will respond, act and feel. He reported that he would not harm himself or another person and did not intend to harm himself or another.   Psychosocial and environmental problems addressed: Discussed with Mr. Doan how he desired to continue to develop his DBT skills. He agreed to review the modules at home and we will begin to review each module. Encouraged him to identify those skills that he remembered as helpful and those that he was interested in developing. Discussed how he might attend a gym and begin to get stronger and fit. Discussed how he would consider making changes to his diet that will help him begin to focus on the nutritional value of his food.    Current GAF: 60   Current medications: Prozac   Significant/ Recent Events: Discussed using the language of DBT. Discussed reviewing the Mindfulness Skills and the other Distress Tolerance Skills. Discussed the Emotional Regulation Module and Interpersonal Effectiveness Module. Discussed how this psychotherapy would be informed by Polyvagal Theory, Positive Psychology (Mindful Self-Compassion), and DBT and he will develop Cognitive Behavioral Skills and Strategies to manage his anxieties and fears and find meaning and balance in his  life. Reminded him how this psychotherapy would focus on his wellbeing and progress at his pace and direction.    Informed consent issues discussed: Mr. Doan expressed his desire to change and was willing to start the process. He reported that he was willing to make changes to his life toward a healthy lifestyle. He reported that he was aware of the problems he experienced and expressed the desire to solve those problems safely. He reported that he had a positive outlook for change. He reported that he was on his own right now but knew he needed to gain and maintain a healthy network of support.   Assignments/ Homework: Discussed with Mr. Doan how Ventral exercises help a person to be ventral and mindful. Discussed how a deep breath, a sigh, and a stretch are example of self-regulation. Discussed how keeping a diary is a way of demonstrating accountability and it is also showing an awareness of the need to practice DBT skills every day.    Plan: Mr. Doan agreed to closely monitor his mood and behavior. He agreed to continue to monitor his diet and sleep and practice some of the skills discussed today.    Diagnoses: F41.1 Generalized Anxiety Disorder [] Provisional   Treatment Plan: [x] Continued [] New [] Replaced Referral:   Suicidal [] Yes [x] No [] Medical:    Violence: [] Yes [x] No [] Psychiatric:    Next session:     [] Neurological:            Justin Burno Psy.D.  Clinical Psychologist  [facility]  NPI: [id number]

## 2022-10-01 DIAGNOSIS — E55.9 VITAMIN D DEFICIENCY: ICD-10-CM

## 2022-10-01 RX ORDER — ERGOCALCIFEROL 1.25 MG/1
50000 CAPSULE ORAL
Qty: 8 CAPSULE | Refills: 0 | Status: SHIPPED | OUTPATIENT
Start: 2022-10-01 | End: 2022-11-30

## 2022-10-26 ENCOUNTER — OFFICE VISIT (OUTPATIENT)
Dept: BEHAVIORAL HEALTH | Facility: CLINIC | Age: 22
End: 2022-10-26
Payer: COMMERCIAL

## 2022-10-26 DIAGNOSIS — F41.1 GENERALIZED ANXIETY DISORDER: ICD-10-CM

## 2022-10-26 PROCEDURE — 90837 PSYTX W PT 60 MINUTES: CPT | Performed by: PSYCHOLOGIST

## 2022-10-31 NOTE — PROGRESS NOTES
Name: Syd Doan Date: 10/26/22  99 Time in session 60 minutes   [] Initial Dx Eval [] Family [] Cancelled/Rescheduled [] Office visit   [x] Individual [] Group [] Late Cancellation [] Virtual Session   [] Couple [] Testing [] No call/No show [] Late   [] Discharge [] Phone [x] On time: 2:00 PM [x]  (12)   Attended: Mr. Doan (He wore a mask)   Observations/ Appearance/ Affect: Mr. Doan appeared clean, well groomed, and clean and dressed in his work scrubs. He was oriented to person, place, time, and purpose, was pleasant and cooperative. His affect was anxious, and mood was euthymic. No suicidal or homicidal ideation described or reported. No reports of hallucinations or confusions. He participated well in this session.   Content/ Topics: Mr. Doan reported that he was doing well. He reported that he had all his paperwork in and now he was just waiting to hear from the [employer] for their approval of his fitness for [employer] school. He reported that he was sleeping better. He reported that he and his parents were getting along well. He reported that he was swimming often to get in shape. He reported that he was dating a girl he knew in high school. He reported that they met at the gym and have been on two dates. He reported that he decided to change jobs. 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 He reported that he struggled with his diet. He reported that he was getting along well with his friends.   Risk issues assessed: Discussed how the practice of savoring this moment of connection was important. Discussed how bringing active awareness to moments of connection and safety help to interrupt the pattern of negativity and support positive change: savoring the state of connection and savor the experience of safety. Discussed how he was safe and staying in a ventral state of connection and safety.  Discussed how he was sleeping better and exercising and was much less anxious. He reported that he would not harm himself or another person and did not intend to harm himself or another.   Psychosocial and environmental problems addressed: Discussed how from a state of safety he is practicing the vagal brake and was more often in a state of connection and safety. Discussed how Dialectical Behavior Therapy Skills Training focuses on learning skills from each of four modules, slowly and carefully, until they are mastered. Reviewed how he learned the skills in sequence: Mindfulness, Distress Tolerance, Emotional Regulation, and Interpersonal Effectiveness. 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    Current GAF: 60   Current medications: Prozac   Significant/ Recent Events: Discussed using the language of DBT. Discussed the Interpersonal Effectiveness Module and reviewed the DEAR MAN Skills. He agreed to review each Module and be ready to discuss skills he would be practicing and using effectively. Discussed how this psychotherapy would be informed by Polyvagal Theory, Positive Psychology (Mindful Self-Compassion), and DBT and he will develop Cognitive Behavioral Skills and Strategies to manage his anxieties and fears and find meaning and balance in his life. Reminded him how this psychotherapy would focus on his wellbeing and progress at his pace and direction.    Informed consent issues discussed: Mr. Doan expressed his desire to change and was willing to start the process. He reported that he was willing to make changes to his life toward a healthy lifestyle. He reported that he was aware of the problems he experienced and expressed the desire to solve those problems safely. He reported that he had a positive outlook for change. He reported that he was on his own right now but knew he needed to  gain and maintain a healthy network of support.   Assignments/ Homework: Discussed with Mr. Doan how Ventral exercises help a person to be ventral and mindful. Discussed how a deep breath, a sigh, and a stretch are example of self-regulation. Discussed how keeping a diary is a way of demonstrating accountability and it is also showing an awareness of the need to practice DBT skills every day.    Plan: Mr. Doan agreed to closely monitor his mood and behavior. He agreed to continue to monitor his diet, sleep, and practice some of the skills discussed today.    Diagnoses: F41.1 Generalized Anxiety Disorder [] Provisional   Treatment Plan: [x] Continued [] New [] Replaced Referral:   Suicidal [] Yes [x] No [] Medical:    Violence: [] Yes [x] No [] Psychiatric:    Next session:     [] Neurological:            Justin Bruno Psy.D.  Clinical Psychologist  [facility]  NPI: [id number]

## 2022-11-14 ENCOUNTER — APPOINTMENT (OUTPATIENT)
Dept: BEHAVIORAL HEALTH | Facility: CLINIC | Age: 22
End: 2022-11-14
Payer: COMMERCIAL

## 2022-12-14 ENCOUNTER — APPOINTMENT (OUTPATIENT)
Dept: BEHAVIORAL HEALTH | Facility: CLINIC | Age: 22
End: 2022-12-14

## 2023-01-04 ENCOUNTER — TELEPHONE (OUTPATIENT)
Dept: MEDICAL GROUP | Facility: MEDICAL CENTER | Age: 23
End: 2023-01-04
Payer: OTHER MISCELLANEOUS

## 2023-01-04 NOTE — TELEPHONE ENCOUNTER
1. Name: Syd Ferris    Call Back Number: [phone] (home)       How would the patient prefer to be contacted with a response: Phone call OK to leave a detailed message     Patient wants a full blood panel ordered. He currently does not have anything ordered in the system. If provider doesn't want to order please let pt know due to pt having an appt in about two weeks to go over labs . Thank you.

## 2023-01-05 DIAGNOSIS — E66.9 OBESITY (BMI 30-39.9): ICD-10-CM

## 2023-01-05 DIAGNOSIS — F33.1 MODERATE EPISODE OF RECURRENT MAJOR DEPRESSIVE DISORDER (HCC): ICD-10-CM

## 2023-01-05 DIAGNOSIS — E55.9 VITAMIN D DEFICIENCY: ICD-10-CM

## 2023-01-05 DIAGNOSIS — F41.1 GENERALIZED ANXIETY DISORDER: ICD-10-CM

## 2023-01-05 DIAGNOSIS — Z00.00 PREVENTATIVE HEALTH CARE: ICD-10-CM

## 2023-01-09 ENCOUNTER — HOSPITAL ENCOUNTER (OUTPATIENT)
Dept: LAB | Facility: MEDICAL CENTER | Age: 23
End: 2023-01-09
Attending: STUDENT IN AN ORGANIZED HEALTH CARE EDUCATION/TRAINING PROGRAM
Payer: OTHER MISCELLANEOUS

## 2023-01-09 DIAGNOSIS — F41.1 GENERALIZED ANXIETY DISORDER: ICD-10-CM

## 2023-01-09 DIAGNOSIS — Z00.00 PREVENTATIVE HEALTH CARE: ICD-10-CM

## 2023-01-09 DIAGNOSIS — E66.9 OBESITY (BMI 30-39.9): ICD-10-CM

## 2023-01-09 DIAGNOSIS — F33.1 MODERATE EPISODE OF RECURRENT MAJOR DEPRESSIVE DISORDER (HCC): ICD-10-CM

## 2023-01-09 DIAGNOSIS — E55.9 VITAMIN D DEFICIENCY: ICD-10-CM

## 2023-01-09 LAB
25(OH)D3 SERPL-MCNC: 10 NG/ML (ref 30–100)
ALBUMIN SERPL BCP-MCNC: 4.3 G/DL (ref 3.2–4.9)
ALBUMIN/GLOB SERPL: 1.3 G/DL
ALP SERPL-CCNC: 100 U/L (ref 30–99)
ALT SERPL-CCNC: 20 U/L (ref 2–50)
ANION GAP SERPL CALC-SCNC: 10 MMOL/L (ref 7–16)
AST SERPL-CCNC: 15 U/L (ref 12–45)
BILIRUB SERPL-MCNC: 0.4 MG/DL (ref 0.1–1.5)
BUN SERPL-MCNC: 21 MG/DL (ref 8–22)
CALCIUM ALBUM COR SERPL-MCNC: 9.3 MG/DL (ref 8.5–10.5)
CALCIUM SERPL-MCNC: 9.5 MG/DL (ref 8.5–10.5)
CHLORIDE SERPL-SCNC: 108 MMOL/L (ref 96–112)
CHOLEST SERPL-MCNC: 198 MG/DL (ref 100–199)
CO2 SERPL-SCNC: 26 MMOL/L (ref 20–33)
CREAT SERPL-MCNC: 0.89 MG/DL (ref 0.5–1.4)
EST. AVERAGE GLUCOSE BLD GHB EST-MCNC: 108 MG/DL
GFR SERPLBLD CREATININE-BSD FMLA CKD-EPI: 123 ML/MIN/1.73 M 2
GLOBULIN SER CALC-MCNC: 3.2 G/DL (ref 1.9–3.5)
GLUCOSE SERPL-MCNC: 89 MG/DL (ref 65–99)
HBA1C MFR BLD: 5.4 % (ref 4–5.6)
HDLC SERPL-MCNC: 55 MG/DL
LDLC SERPL CALC-MCNC: 127 MG/DL
POTASSIUM SERPL-SCNC: 4.4 MMOL/L (ref 3.6–5.5)
PROT SERPL-MCNC: 7.5 G/DL (ref 6–8.2)
SODIUM SERPL-SCNC: 144 MMOL/L (ref 135–145)
TRIGL SERPL-MCNC: 80 MG/DL (ref 0–149)
TSH SERPL DL<=0.005 MIU/L-ACNC: 0.76 UIU/ML (ref 0.38–5.33)

## 2023-01-09 PROCEDURE — 80061 LIPID PANEL: CPT

## 2023-01-09 PROCEDURE — 36415 COLL VENOUS BLD VENIPUNCTURE: CPT

## 2023-01-09 PROCEDURE — 82306 VITAMIN D 25 HYDROXY: CPT

## 2023-01-09 PROCEDURE — 80053 COMPREHEN METABOLIC PANEL: CPT

## 2023-01-09 PROCEDURE — 83036 HEMOGLOBIN GLYCOSYLATED A1C: CPT

## 2023-01-09 PROCEDURE — 84443 ASSAY THYROID STIM HORMONE: CPT

## 2023-01-13 ENCOUNTER — OFFICE VISIT (OUTPATIENT)
Dept: MEDICAL GROUP | Facility: MEDICAL CENTER | Age: 23
End: 2023-01-13
Payer: OTHER MISCELLANEOUS

## 2023-01-13 VITALS
BODY MASS INDEX: 37.37 KG/M2 | RESPIRATION RATE: 17 BRPM | HEIGHT: 67 IN | HEART RATE: 79 BPM | OXYGEN SATURATION: 95 % | DIASTOLIC BLOOD PRESSURE: 80 MMHG | SYSTOLIC BLOOD PRESSURE: 114 MMHG | WEIGHT: 238.1 LBS | TEMPERATURE: 98.1 F

## 2023-01-13 DIAGNOSIS — F33.1 MODERATE EPISODE OF RECURRENT MAJOR DEPRESSIVE DISORDER (HCC): ICD-10-CM

## 2023-01-13 DIAGNOSIS — F41.1 GENERALIZED ANXIETY DISORDER: ICD-10-CM

## 2023-01-13 DIAGNOSIS — E66.9 OBESITY (BMI 30-39.9): ICD-10-CM

## 2023-01-13 DIAGNOSIS — F40.10 SOCIAL ANXIETY DISORDER: ICD-10-CM

## 2023-01-13 DIAGNOSIS — E78.00 PURE HYPERCHOLESTEROLEMIA: ICD-10-CM

## 2023-01-13 DIAGNOSIS — E55.9 VITAMIN D DEFICIENCY: ICD-10-CM

## 2023-01-13 PROBLEM — R74.01 ELEVATED AST (SGOT): Status: RESOLVED | Noted: 2021-12-02 | Resolved: 2023-01-13

## 2023-01-13 PROCEDURE — 99214 OFFICE O/P EST MOD 30 MIN: CPT | Performed by: STUDENT IN AN ORGANIZED HEALTH CARE EDUCATION/TRAINING PROGRAM

## 2023-01-13 RX ORDER — ERGOCALCIFEROL 1.25 MG/1
50000 CAPSULE ORAL
Qty: 8 CAPSULE | Refills: 0 | Status: SHIPPED | OUTPATIENT
Start: 2023-01-13 | End: 2023-03-14

## 2023-01-13 ASSESSMENT — PATIENT HEALTH QUESTIONNAIRE - PHQ9: CLINICAL INTERPRETATION OF PHQ2 SCORE: 0

## 2023-01-13 ASSESSMENT — FIBROSIS 4 INDEX: FIB4 SCORE: 0.34

## 2023-01-13 NOTE — PROGRESS NOTES
Subjective:     CC: chronic conditions follow up    HPI:   Syd presents today for chronic conditions follow up    Problem   Pure Hypercholesterolemia    Chronic condition.     Vitamin D Deficiency    Chronic condition.     Moderate Episode of Recurrent Major Depressive Disorder (Hcc)    Chronic condition since high school. Since last visit, he has discontinue fluoxetine since getting a new pet (Siberian husky) which is keep his symptoms well controlled. He is trying to get a letter for emotional service animal.    Current regimen: none    He is also followed by psychiatry. He has been doing psychotherapy weekly and reports helping a lot.     Treatment course:  12/2/2021: Initial evaluation for his depression symptoms. He was started on fluoxetine 10mg daily for moderate major depressive disorder and referred to behavioral health.  12/23/2021: increased fluoxetine to 20mg daily  1/28/2022: increased fluoxetine to 40mg daily  2/2/2022: first visit with psychiatrist and continued on fluoxetine 40mg daily  2/4/2022: first visit with psychologist for psychotherapy  3/30/2022: increased fluoxetine to 60mg daily    Initial history:  He feels sad sometimes, low energy, low energy, brain fog. He has tried counseling therapy last year which helped but there was an issue with insurance so that stopped. He has never tried medication in the past. There are home stressors and work stressors. Denies SI/HI. He sleeps about 5-6 hours a night. He does feel sleepy in the afternoon. He tends to eat a lot at once for dinner. Energy level is usually low. Hanging out with his friend and family makes him happy.     Generalized Anxiety Disorder    Chronic condition since high school. Since last visit, he has discontinue fluoxetine since getting a new pet (Siberian tatianaky) which is keep his symptoms well controlled. He is trying to get a letter for emotional service animal.    Current regimen: none    He is also followed by psychiatry.        Depression    Chronic condition since high school. Since last visit, patient has been taking fluoxetine 60mg daily. He reports a 80% in his symptoms so far. He has noticed a favorable decreased appetite with 7lb weight loss since last visit. He has been doing psychotherapy weekly and reports helping a lot.     Initial history:  He feels sad sometimes, low energy, low energy, brain fog. He has tried counseling therapy last year which helped but there was an issue with insurance so that stopped. He has never tried medication in the past. There are home stressors and work stressors. Denies SI/HI. He sleeps about 5-6 hours a night. He does feel sleepy in the afternoon. He tends to eat a lot at once for dinner. Energy level is usually low. Hanging out with his friend and family makes him happy.     Social Anxiety Disorder    Chronic condition. He feels he tends to keep to himself most of the time.    Since last visit, he has discontinue fluoxetine since getting a new pet (Siberian husky) which is keep his symptoms well controlled. He is trying to get a letter for emotional service animal.    Current regimen: none    He is also followed by psychiatry.     Obesity (Bmi 30-39.9)    Chronic condition. He would like to get recommendations on how to lose weight. He tends to eat fast food in general. He stays active with going to gym 3x/week for about an hour each time.       Elevated Ast (Sgot) (Resolved)    Chronic condition. Recent AST 60s (09/2021).          Current Outpatient Medications Ordered in Epic   Medication Sig Dispense Refill    ergocalciferol (DRISDOL) 25770 UNIT capsule Take 1 Capsule by mouth every 7 days for 60 days. 8 Capsule 0     No current Epic-ordered facility-administered medications on file.     SH: lives with parents at home, single, he drinks 10 shots at parties which is occasional, he is going to [employer] for associate degree, never smoker, social EtOH, denies illicit drugs     Health Maintenance:  "reviewed and discussed with patient  Vaccines: flu received 09/2021, Moderna #2 received 07/2021, Tdap received 02/2020, Trumenba completed 07/2018, Gardasil 9 completed 02/2016, Moderna COVID #2 received 07/2021 (plans to get booster)     ROS:  Gen: no fevers/chills  Pulm: no sob, no cough  CV: no chest pain, no palpitations  GI: no nausea/vomiting, no diarrhea  Neuro: no headaches  Psych: + chronic depression, anxiety    Objective:     Exam:  /80 (BP Location: Left arm, Patient Position: Sitting, BP Cuff Size: Large adult)   Pulse 79   Temp 36.7 °C (98.1 °F) (Temporal)   Resp 17   Ht 1.702 m (5' 7\")   Wt 108 kg (238 lb 1.6 oz)   SpO2 95%   BMI 37.29 kg/m²  Body mass index is 37.29 kg/m².    General: Normal appearing. No distress.  Pulmonary: Clear to ausculation. Normal effort. No rales, ronchi, or wheezing.  Cardiovascular: Regular rate and rhythm without murmur.  Abdomen: Soft, nontender, nondistended. Normal bowel sounds.  Psych: Normal mood and affect. Alert and oriented x3. Judgment and insight is normal.    Labs:   Lab Results   Component Value Date/Time    SODIUM 144 01/09/2023 02:03 PM    POTASSIUM 4.4 01/09/2023 02:03 PM    CHLORIDE 108 01/09/2023 02:03 PM    CO2 26 01/09/2023 02:03 PM    ANION 10.0 01/09/2023 02:03 PM    GLUCOSE 89 01/09/2023 02:03 PM    BUN 21 01/09/2023 02:03 PM    CREATININE 0.89 01/09/2023 02:03 PM    CALCIUM 9.5 01/09/2023 02:03 PM    ASTSGOT 15 01/09/2023 02:03 PM    ALTSGPT 20 01/09/2023 02:03 PM    TBILIRUBIN 0.4 01/09/2023 02:03 PM    ALBUMIN 4.3 01/09/2023 02:03 PM    TOTPROTEIN 7.5 01/09/2023 02:03 PM    GLOBULIN 3.2 01/09/2023 02:03 PM    AGRATIO 1.3 01/09/2023 02:03 PM     Lab Results   Component Value Date/Time    HBA1C 5.4 01/09/2023 02:03 PM      Lab Results   Component Value Date/Time    CHOLSTRLTOT 198 01/09/2023 1403    TRIGLYCERIDE 80 01/09/2023 1403    HDL 55 01/09/2023 1403     (H) 01/09/2023 1403     Lab Results   Component Value Date/Time    " TSHULTRASEN 0.760 01/09/2023 1403     25-Hydroxy   Vitamin D 25 (ng/mL)   Date Value   01/09/2023 10 (L)   09/29/2022 11 (L)       Assessment & Plan:     22 y.o. male with the following -     1. Pure hypercholesterolemia  Chronic condition. Stable with healthy lifestyle management.  - Advised healthy diet/weight loss, regular exercise    2. Vitamin D deficiency  Chronic condition, persistent.  - start vitamin D supplement per order, then take over the counter vitamin D3 5000 IU daily  - ergocalciferol (DRISDOL) 34607 UNIT capsule; Take 1 Capsule by mouth every 7 days for 60 days.  Dispense: 8 Capsule; Refill: 0    3. Moderate episode of recurrent major depressive disorder (HCC)  Chronic condition, stable without medications. Symptoms improved with new dog. Followed by psychiatry.  - cont management per psychiatry  - advised to follow up with psychiatrist or online certification    4. Generalized anxiety disorder  Chronic condition, stable without medications. Symptoms improved with new dog. Followed by psychiatry.  - cont management per psychiatry  - advised to follow up with psychiatrist or online certification    5. Social anxiety disorder  Chronic condition, stable without medications. Symptoms improved with new dog. Followed by psychiatry.  - cont management per psychiatry  - advised to follow up with psychiatrist or online certification    6. Obesity (BMI 30-39.9)  Chronic condition, stable.  - Advised healthy diet/weight loss, regular exercise    Return in about 1 year (around 1/13/2024) for annual physical.    Please note that this dictation was created using voice recognition software. I have made every reasonable attempt to correct obvious errors, but I expect that there are errors of grammar and possibly content that I did not discover before finalizing the note.

## 2023-03-06 ENCOUNTER — OFFICE VISIT (OUTPATIENT)
Dept: BEHAVIORAL HEALTH | Facility: CLINIC | Age: 23
End: 2023-03-06
Payer: COMMERCIAL

## 2023-03-06 DIAGNOSIS — F41.1 GENERALIZED ANXIETY DISORDER: ICD-10-CM

## 2023-03-06 PROCEDURE — 90837 PSYTX W PT 60 MINUTES: CPT | Performed by: PSYCHOLOGIST

## 2023-03-07 ENCOUNTER — APPOINTMENT (OUTPATIENT)
Dept: BEHAVIORAL HEALTH | Facility: CLINIC | Age: 23
End: 2023-03-07
Payer: COMMERCIAL

## 2023-03-07 NOTE — PROGRESS NOTES
Name: Syd Doan Date: 3/6/23  99 Time in session 60 minutes   [] Initial Dx Eval [] Family [] Cancelled/Rescheduled [] Office visit   [x] Individual [] Group [] Late Cancellation [] Virtual Session   [] Couple [] Testing [] No call/No show [] Late   [] Discharge [] Phone [x] On time: 4:00 PM [x]  (13)   Attended: Mr. Doan (He wore a mask)   Observations/ Appearance/ Affect: Mr. Doan appeared clean, well groomed, and clean and dressed in his work scrubs. He was oriented to person, place, time, and purpose, was pleasant and cooperative. His affect was anxious, and mood was euthymic. No suicidal or homicidal ideation described or reported. No reports of hallucinations or confusions. He participated well in this session.   Content/ Topics: Mr. Doan reported that he was doing well. 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 He reported that he hoped to hear from them this summer. He reported that he was working at "[employer]" and enjoyed the work. He reported that it was parttime and he hoped to make it full time. 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 He reported that his parents were doing well. He reported that they caught COVID-19 but were fully recovered. He reported that the CPAP machine was helping him sleep. He reported that he got a dog (a huskie) and he really enjoy having a dog. He reported that he was eating too much junk food at "[employer]". Discussed how he might consider joining the [employer] for flight school, but he has to work on his physical fitness. He reported that he was getting back into his old habit of being irritable at home.    Risk issues assessed: Discussed how the practice of savoring this moment of connection was important. Discussed how bringing active awareness to moments of connection and safety help to interrupt the pattern of negativity and  support positive change: savoring the state of connection and savor the experience of safety. Discussed how he was safe and staying in a ventral state of connection and safety. Discussed how he was sleeping better and exercising and was much less anxious. He reported that he would not harm himself or another person and did not intend to harm himself or another.   Psychosocial and environmental problems addressed: Discussed how from a state of safety he can practice the vagal brake and be more often in a state of connection and safety. Discussed how Dialectical Behavior Therapy Skills Training focused on learning skills from each of four modules, slowly and carefully, until they are mastered. Reviewed how he learned the skills in sequence: Mindfulness, Distress Tolerance, Emotional Regulation, and Interpersonal Effectiveness. Encouraged him to review his skills. Discussed how he used these skills at home and at work. Discussed how he used Polyvagal Theory to stay calm and can use the ventral brake to be mindful. Discussed how a deep breath, a sigh, and a stretch are example of self-regulation. Discussed how he was mindful, and this helped him to be aware of the need to practice DBT skills every day.    Current GAF: 60   Current medications: Prozac   Significant/ Recent Events: Discussed how he can use his skills to become an Emotions , paying attention to his emotions. Discussed how he can focus and appreciate enjoyable emotions and use skills to manage difficult emotions. Discussed how emotions involve physical changes to the body, how they impact the way we think and change the way we communicate and behave. Discussed how the PLEASE skills help us to take care of our body and physical health. The PLEAE skills help to reduce emotional vulnerability (Jeremy). The PL is to remind us to take care of ourselves when we are sick. Discussed how Eating a balance diet helps us to have enough energy to get through  the day. Discussed how it is important to Avoid mood-altering drugs; and avoid alcohol, tobacco, and limit caffeine. Discussed how it is important to get enough restful Sleep usually eight to ten hours each night. Discussed how it is important to get at least 20 minutes of Exercise every day and to stay physically active. Discussed finding the balance between Priorities and Demands. Discussed how priorities are things that are important to you and on which you want to spend your time. Discussed how demands are things that are important to others and are what they want you to spend your time on. Finding the balance between priorities and demands is important. With balance, you and others will get what you need in the relationship (Jeremy, Kim). Discussed ways to practice interpersonal effectiveness skills (skills to improve relationships). He reported that he would go to the [employer] and see if there are flying classes or other aviation courses offered there.   Informed consent issues discussed: Mr. Doan expressed his desire to return to [facility] and work on his skills. He reported that he was willing to make changes to his life toward a healthy lifestyle. He reported that he was aware of the problems he experienced and expressed the desire to solve those problems safely. He reported that he had a positive outlook for change. He reported that he would talk with friends about a roommate situation and begin to work on his self-care and independence skills.   Assignments/ Homework: Discussed with Mr. Doan how Ventral exercises help a person to be ventral and mindful. Discussed how a deep breath, a sigh, and a stretch are example of self-regulation. Discussed how keeping a diary is a way of demonstrating accountability and it is also showing an awareness of the need to practice DBT skills every day.    Plan: Mr. Doan agreed to closely monitor his mood and behavior. He agreed to continue to monitor his diet, sleep,  and practice some of the skills discussed today.    Diagnoses: F41.1 Generalized Anxiety Disorder [] Provisional   Treatment Plan: [x] Continued [] New [] Replaced Referral:   Suicidal [] Yes [x] No [] Medical:    Violence: [] Yes [x] No [] Psychiatric:    Next session:     [] Neurological:            Justin Bruno Psy.D.  Clinical Psychologist  [facility]  NPI: [id number]

## 2023-03-16 ENCOUNTER — OFFICE VISIT (OUTPATIENT)
Dept: MEDICAL GROUP | Facility: MEDICAL CENTER | Age: 23
End: 2023-03-16
Payer: COMMERCIAL

## 2023-03-16 VITALS
SYSTOLIC BLOOD PRESSURE: 118 MMHG | OXYGEN SATURATION: 95 % | TEMPERATURE: 97.5 F | BODY MASS INDEX: 38.06 KG/M2 | HEART RATE: 71 BPM | HEIGHT: 67 IN | DIASTOLIC BLOOD PRESSURE: 82 MMHG | WEIGHT: 242.51 LBS

## 2023-03-16 DIAGNOSIS — E66.9 OBESITY (BMI 30-39.9): ICD-10-CM

## 2023-03-16 DIAGNOSIS — E55.9 VITAMIN D DEFICIENCY: ICD-10-CM

## 2023-03-16 DIAGNOSIS — F33.1 MODERATE EPISODE OF RECURRENT MAJOR DEPRESSIVE DISORDER (HCC): ICD-10-CM

## 2023-03-16 DIAGNOSIS — F41.1 GENERALIZED ANXIETY DISORDER: ICD-10-CM

## 2023-03-16 DIAGNOSIS — Z00.00 PREVENTATIVE HEALTH CARE: ICD-10-CM

## 2023-03-16 PROCEDURE — 99214 OFFICE O/P EST MOD 30 MIN: CPT | Performed by: STUDENT IN AN ORGANIZED HEALTH CARE EDUCATION/TRAINING PROGRAM

## 2023-03-16 ASSESSMENT — FIBROSIS 4 INDEX: FIB4 SCORE: 0.34

## 2023-03-16 NOTE — LETTER
2023    To Whom It May Concern:         This is confirmation that Syd Ferris (: 2000) attended his scheduled appointment with Yassine Rico D.O. on 3/16/23.    Syd has been off of anti-depressant (fluoxetine) medication since 2022 and symptoms have been well controlled without the medication.          If you have any questions please do not hesitate to call me at the phone number listed below.    Sincerely,          Yassine Rico D.O.  [phone]

## 2023-03-16 NOTE — PROGRESS NOTES
Subjective:     CC: chronic conditions follow up    HPI:   Syd presents today for chronic conditions follow up    Problem   Vitamin D Deficiency    Chronic condition. He has been taking the vit D2 supplements and has a few weeks left.     Preventative Health Care    Patient is due for annual labs.     Moderate Episode of Recurrent Major Depressive Disorder (Hcc)    Chronic condition since high school. Since 12/2022, he has discontinued fluoxetine since getting a new pet (Siberian husky) which is keep his symptoms well controlled.    Current regimen: none    He is also followed by psychiatry. He has been doing psychotherapy weekly and reports helping a lot.     Treatment course:  12/2/2021: Initial evaluation for his depression symptoms. He was started on fluoxetine 10mg daily for moderate major depressive disorder and referred to behavioral health.  12/23/2021: increased fluoxetine to 20mg daily  1/28/2022: increased fluoxetine to 40mg daily  2/2/2022: first visit with psychiatrist and continued on fluoxetine 40mg daily  2/4/2022: first visit with psychologist for psychotherapy  3/30/2022: increased fluoxetine to 60mg daily    Initial history:  He feels sad sometimes, low energy, low energy, brain fog. He has tried counseling therapy last year which helped but there was an issue with insurance so that stopped. He has never tried medication in the past. There are home stressors and work stressors. Denies SI/HI. He sleeps about 5-6 hours a night. He does feel sleepy in the afternoon. He tends to eat a lot at once for dinner. Energy level is usually low. Hanging out with his friend and family makes him happy.     Generalized Anxiety Disorder    Chronic condition since high school. Since 12/2022, he has discontinued fluoxetine since getting a new pet (Siberian husky) which is keep his symptoms well controlled.    Current regimen: none    He is also followed by psychiatry.       Obesity (Bmi 30-39.9)    Chronic  "condition. He has been trying to cut down on snacks. He tends to eat fast food in general. He stays active with going to gym 3x/week for about an hour each time.           No current [facility]-ordered outpatient medications on file.     No current [facility]-ordered facility-administered medications on file.     SH: lives with parents at home, single, he drinks 10 shots at parties which is occasional, he is going to [employer] for associate degree, never smoker, social EtOH, denies illicit drugs     Health Maintenance: reviewed and discussed with patient  Vaccines: flu received 09/2021, Moderna #2 received 07/2021, Tdap received 02/2020, Trumenba completed 07/2018, Gardasil 9 completed 02/2016, Moderna COVID #2 received 07/2021 (plans to get booster)     ROS:  Gen: no fevers/chills  Pulm: no sob, no cough  CV: no chest pain, no palpitations  GI: no nausea/vomiting, no diarrhea  Neuro: no headaches  Psych: + chronic depression, anxiety    Objective:     Exam:  /82 (BP Location: Right arm, Patient Position: Sitting, BP Cuff Size: Adult)   Pulse 71   Temp 36.4 °C (97.5 °F) (Temporal)   Ht 1.702 m (5' 7\")   Wt 110 kg (242 lb 8.1 oz)   SpO2 95%   BMI 37.98 kg/m²  Body mass index is 37.98 kg/m².    General: Normal appearing. No distress.  Pulmonary: Clear to ausculation. Normal effort. No rales, ronchi, or wheezing.  Cardiovascular: Regular rate and rhythm without murmur.  Abdomen: Soft, nontender, nondistended. Normal bowel sounds.  Psych: Normal mood and affect. Alert and oriented x3. Judgment and insight is normal.    Labs:   Lab Results   Component Value Date/Time    SODIUM 144 01/09/2023 02:03 PM    POTASSIUM 4.4 01/09/2023 02:03 PM    CHLORIDE 108 01/09/2023 02:03 PM    CO2 26 01/09/2023 02:03 PM    ANION 10.0 01/09/2023 02:03 PM    GLUCOSE 89 01/09/2023 02:03 PM    BUN 21 01/09/2023 02:03 PM    CREATININE 0.89 01/09/2023 02:03 PM    CALCIUM 9.5 01/09/2023 02:03 PM    ASTSGOT 15 01/09/2023 02:03 PM    ALTSGPT 20 " 01/09/2023 02:03 PM    TBILIRUBIN 0.4 01/09/2023 02:03 PM    ALBUMIN 4.3 01/09/2023 02:03 PM    TOTPROTEIN 7.5 01/09/2023 02:03 PM    GLOBULIN 3.2 01/09/2023 02:03 PM    AGRATIO 1.3 01/09/2023 02:03 PM     Lab Results   Component Value Date/Time    HBA1C 5.4 01/09/2023 02:03 PM      Lab Results   Component Value Date/Time    CHOLSTRLTOT 198 01/09/2023 1403    TRIGLYCERIDE 80 01/09/2023 1403    HDL 55 01/09/2023 1403     (H) 01/09/2023 1403     Lab Results   Component Value Date/Time    TSHULTRASEN 0.760 01/09/2023 1403     25-Hydroxy   Vitamin D 25 (ng/mL)   Date Value   01/09/2023 10 (L)   09/29/2022 11 (L)       Assessment & Plan:     22 y.o. male with the following -     1. Moderate episode of recurrent major depressive disorder (HCC)  Chronic condition, stable without medications. Symptoms improved with new dog. Followed by psychiatry.  - cont interval monitoring  - TSH WITH REFLEX TO FT4; Future    2. Generalized anxiety disorder  Chronic condition, stable without medications. Symptoms improved with new dog. Followed by psychiatry.  - cont interval monitoring    3. Vitamin D deficiency  Chronic condition, stable.  - cont current regimen: cont vit D supplement  - recheck with next blood test  - VITAMIN D,25 HYDROXY (DEFICIENCY); Future    4. Obesity (BMI 30-39.9)  - Comp Metabolic Panel; Future  - Lipid Profile; Future  - TSH WITH REFLEX TO FT4; Future  - VITAMIN D,25 HYDROXY (DEFICIENCY); Future  - Patient identified as having weight management issue.  Appropriate orders and counseling given.    5. Preventative health care  - Comp Metabolic Panel; Future  - Lipid Profile; Future  - TSH WITH REFLEX TO FT4; Future      Return in about 1 year (around 3/16/2024) for annual physical.    Please note that this dictation was created using voice recognition software. I have made every reasonable attempt to correct obvious errors, but I expect that there are errors of grammar and possibly content that I did not  discover before finalizing the note.

## 2023-04-04 ENCOUNTER — TELEPHONE (OUTPATIENT)
Dept: SLEEP MEDICINE | Facility: MEDICAL CENTER | Age: 23
End: 2023-04-04
Payer: COMMERCIAL

## 2023-04-04 NOTE — TELEPHONE ENCOUNTER
04-04-23  1st NO SHOW  Date of No Show: 04-03-23  Provider: CLOVER Pena  Reason For Visit: FV/ Sleep   Outcome of call:    no answer LVM.  Left call back for scheduling [phone].     Reason Missed: unknown  ACM

## 2023-04-05 ENCOUNTER — APPOINTMENT (OUTPATIENT)
Dept: BEHAVIORAL HEALTH | Facility: CLINIC | Age: 23
End: 2023-04-05
Payer: COMMERCIAL

## 2023-05-16 ENCOUNTER — OFFICE VISIT (OUTPATIENT)
Dept: BEHAVIORAL HEALTH | Facility: CLINIC | Age: 23
End: 2023-05-16
Payer: COMMERCIAL

## 2023-05-16 DIAGNOSIS — F33.1 MODERATE EPISODE OF RECURRENT MAJOR DEPRESSIVE DISORDER (HCC): ICD-10-CM

## 2023-05-16 PROCEDURE — 99999 PR NO CHARGE: CPT | Performed by: PSYCHIATRY & NEUROLOGY

## 2023-05-16 NOTE — PROGRESS NOTES
[facility]   Follow Up Assessment     This provider informed the patient their medical records are totally confidential except for the use by other providers involved in their care, or if the patient signs a release, or to report instances of child or elder abuse, or if it is determined they are an immediate risk to harm themselves or others.    Name: Syd Ferris  MRN: [id number]  : 2000  Age: 22 y.o.  Date of assessment: 2023  PCP: Yassine Rico D.O.      Subjective:  I believe there was a misunderstanding about the referral for a psychiatric evaluation.  He previously saw Dr. Benitez on  but has not taken Prozac 40mg qam recently.  He is seeing Dr. Bruno for psychotherapy.  He does not wish to take an antidepressant or any other psychiatric medication at this time.    Objective:      Current Risk:       Suicidal:        Homicidal:        Self-Harm:       Relapse: (Low/Moderate/High):       Crisis Safety Plan Reviewed:    Diagnosis:       Treatment Plan:      Tristen Umanzor M.D.      This note was created using voice recognition software (Dragon). The accuracy of the dictation is limited by the abilities of the software. I have reviewed the note prior to signing, however some errors in grammar and context are still possible. If you have any questions related to this note please do not hesitate to contact our office.

## 2023-10-11 ENCOUNTER — TELEPHONE (OUTPATIENT)
Dept: HEALTH INFORMATION MANAGEMENT | Facility: OTHER | Age: 23
End: 2023-10-11
Payer: COMMERCIAL

## 2023-10-11 ENCOUNTER — DOCUMENTATION (OUTPATIENT)
Dept: HEALTH INFORMATION MANAGEMENT | Facility: OTHER | Age: 23
End: 2023-10-11
Payer: COMMERCIAL